# Patient Record
Sex: MALE | Race: WHITE | NOT HISPANIC OR LATINO | Employment: UNEMPLOYED | URBAN - NONMETROPOLITAN AREA
[De-identification: names, ages, dates, MRNs, and addresses within clinical notes are randomized per-mention and may not be internally consistent; named-entity substitution may affect disease eponyms.]

---

## 2017-01-24 ENCOUNTER — TELEPHONE (OUTPATIENT)
Dept: CARDIOLOGY | Facility: CLINIC | Age: 62
End: 2017-01-24

## 2017-02-02 NOTE — TELEPHONE ENCOUNTER
Ruth called me this morning and left a vm msg to call her back.  (I was helping with another clinic).  I called her back this afternoon and got her vm again, so I left a msg to let me know if pt is having any chest discomfort or not so we can proceed with getting the pt scheduled for the  or not.  I am still waiting for a response.  I told her she can leave this info on my vm if she gets it again so that I can get an answer instead of us playing phone tag.  Edgard Fraser, CMA

## 2017-02-03 NOTE — TELEPHONE ENCOUNTER
Rosalva from [facility] called me back and I was finally able to speak with her.  She said pt was evaluated on 2/1/17 and noted some left sided chest pain.  Need to know if Dr. Naranjo wants to proceed with the .?  Edgard Fraser, CMA

## 2017-02-06 DIAGNOSIS — I20.9 ANGINA, CLASS III (HCC): Primary | ICD-10-CM

## 2017-02-06 NOTE — TELEPHONE ENCOUNTER
I called and left a  msg for roberto at the [facility] and I put the order in for pt to have this done on 2/23/17.  I left on the  that we will be arranging the procedure on our end so she can arrange transportation for the pt on her end.  Edgard Fraser, CMA

## 2017-02-23 ENCOUNTER — HOSPITAL ENCOUNTER (OUTPATIENT)
Facility: HOSPITAL | Age: 62
Discharge: HOME | End: 2017-02-24
Attending: INTERNAL MEDICINE | Admitting: INTERNAL MEDICINE

## 2017-02-23 DIAGNOSIS — I20.9 ANGINA, CLASS III (HCC): ICD-10-CM

## 2017-02-23 LAB
ACT BLD: 240 SECONDS (ref 82–152)
ACT BLD: 312 SECONDS (ref 82–152)
ALBUMIN SERPL-MCNC: 4.6 G/DL (ref 3.5–5)
ALBUMIN/GLOB SERPL: 1.6 G/DL (ref 1.1–2.5)
ALP SERPL-CCNC: 78 U/L (ref 24–120)
ALT SERPL W P-5'-P-CCNC: 42 U/L (ref 0–54)
ANION GAP SERPL CALCULATED.3IONS-SCNC: 12 MMOL/L (ref 4–13)
AST SERPL-CCNC: 35 U/L (ref 7–45)
BACTERIA UR QL AUTO: ABNORMAL /HPF
BILIRUB SERPL-MCNC: 0.7 MG/DL (ref 0.1–1)
BILIRUB UR QL STRIP: NEGATIVE
BUN BLD-MCNC: 8 MG/DL (ref 5–21)
BUN/CREAT SERPL: 7.9 (ref 7–25)
CALCIUM SPEC-SCNC: 9.5 MG/DL (ref 8.4–10.4)
CHLORIDE SERPL-SCNC: 104 MMOL/L (ref 98–110)
CLARITY UR: CLEAR
CO2 SERPL-SCNC: 28 MMOL/L (ref 24–31)
COLOR UR: YELLOW
CREAT BLD-MCNC: 1.01 MG/DL (ref 0.5–1.4)
DEPRECATED RDW RBC AUTO: 45.6 FL (ref 40–54)
ERYTHROCYTE [DISTWIDTH] IN BLOOD BY AUTOMATED COUNT: 13.5 % (ref 12–15)
GFR SERPL CREATININE-BSD FRML MDRD: 75 ML/MIN/1.73
GLOBULIN UR ELPH-MCNC: 2.8 GM/DL
GLUCOSE BLD-MCNC: 102 MG/DL (ref 70–100)
GLUCOSE UR STRIP-MCNC: NEGATIVE MG/DL
HCT VFR BLD AUTO: 47 % (ref 40–52)
HGB BLD-MCNC: 15.9 G/DL (ref 14–18)
HGB UR QL STRIP.AUTO: ABNORMAL
HYALINE CASTS UR QL AUTO: ABNORMAL /LPF
KETONES UR QL STRIP: NEGATIVE
LEUKOCYTE ESTERASE UR QL STRIP.AUTO: NEGATIVE
MCH RBC QN AUTO: 31.5 PG (ref 28–32)
MCHC RBC AUTO-ENTMCNC: 33.8 G/DL (ref 33–36)
MCV RBC AUTO: 93.1 FL (ref 82–95)
NITRITE UR QL STRIP: NEGATIVE
PH UR STRIP.AUTO: 7 [PH] (ref 5–8)
PLATELET # BLD AUTO: 181 10*3/MM3 (ref 130–400)
PMV BLD AUTO: 11.2 FL (ref 6–12)
POTASSIUM BLD-SCNC: 4 MMOL/L (ref 3.5–5.3)
PROT SERPL-MCNC: 7.4 G/DL (ref 6.3–8.7)
PROT UR QL STRIP: NEGATIVE
RBC # BLD AUTO: 5.05 10*6/MM3 (ref 4.8–5.9)
RBC # UR: ABNORMAL /HPF
REF LAB TEST METHOD: ABNORMAL
SODIUM BLD-SCNC: 144 MMOL/L (ref 135–145)
SP GR UR STRIP: 1.01 (ref 1–1.03)
SQUAMOUS #/AREA URNS HPF: ABNORMAL /HPF
UROBILINOGEN UR QL STRIP: ABNORMAL
WBC NRBC COR # BLD: 7.29 10*3/MM3 (ref 4.8–10.8)
WBC UR QL AUTO: ABNORMAL /HPF

## 2017-02-23 PROCEDURE — 81001 URINALYSIS AUTO W/SCOPE: CPT | Performed by: INTERNAL MEDICINE

## 2017-02-23 PROCEDURE — C1769 GUIDE WIRE: HCPCS | Performed by: INTERNAL MEDICINE

## 2017-02-23 PROCEDURE — C1874 STENT, COATED/COV W/DEL SYS: HCPCS | Performed by: INTERNAL MEDICINE

## 2017-02-23 PROCEDURE — 85347 COAGULATION TIME ACTIVATED: CPT

## 2017-02-23 PROCEDURE — C1725 CATH, TRANSLUMIN NON-LASER: HCPCS | Performed by: INTERNAL MEDICINE

## 2017-02-23 PROCEDURE — 25010000002 DIPHENHYDRAMINE PER 50 MG: Performed by: INTERNAL MEDICINE

## 2017-02-23 PROCEDURE — 25010000002 MIDAZOLAM PER 1 MG: Performed by: INTERNAL MEDICINE

## 2017-02-23 PROCEDURE — 80053 COMPREHEN METABOLIC PANEL: CPT | Performed by: INTERNAL MEDICINE

## 2017-02-23 PROCEDURE — C1894 INTRO/SHEATH, NON-LASER: HCPCS | Performed by: INTERNAL MEDICINE

## 2017-02-23 PROCEDURE — C1887 CATHETER, GUIDING: HCPCS | Performed by: INTERNAL MEDICINE

## 2017-02-23 PROCEDURE — 93005 ELECTROCARDIOGRAM TRACING: CPT | Performed by: INTERNAL MEDICINE

## 2017-02-23 PROCEDURE — 99219 PR INITIAL OBSERVATION CARE/DAY 50 MINUTES: CPT | Performed by: NURSE PRACTITIONER

## 2017-02-23 PROCEDURE — G0378 HOSPITAL OBSERVATION PER HR: HCPCS

## 2017-02-23 PROCEDURE — 85027 COMPLETE CBC AUTOMATED: CPT | Performed by: INTERNAL MEDICINE

## 2017-02-23 PROCEDURE — 25010000002 FENTANYL CITRATE (PF) 100 MCG/2ML SOLUTION: Performed by: INTERNAL MEDICINE

## 2017-02-23 PROCEDURE — 25010000002 HEPARIN (PORCINE) PER 1000 UNITS: Performed by: INTERNAL MEDICINE

## 2017-02-23 PROCEDURE — 93010 ELECTROCARDIOGRAM REPORT: CPT | Performed by: INTERNAL MEDICINE

## 2017-02-23 PROCEDURE — C1760 CLOSURE DEV, VASC: HCPCS | Performed by: INTERNAL MEDICINE

## 2017-02-23 PROCEDURE — C9607 PERC D-E COR REVASC CHRO SIN: HCPCS | Performed by: INTERNAL MEDICINE

## 2017-02-23 DEVICE — EVEROLIMUS-ELUTING PLATINUM CHROMIUM CORONARY STENT SYSTEM
Type: IMPLANTABLE DEVICE | Status: FUNCTIONAL
Brand: SYNERGY™

## 2017-02-23 RX ORDER — INDOMETHACIN 50 MG/1
50 CAPSULE ORAL 2 TIMES DAILY WITH MEALS
Status: DISCONTINUED | OUTPATIENT
Start: 2017-02-23 | End: 2017-02-23

## 2017-02-23 RX ORDER — PANTOPRAZOLE SODIUM 40 MG/1
40 TABLET, DELAYED RELEASE ORAL DAILY
Status: DISCONTINUED | OUTPATIENT
Start: 2017-02-23 | End: 2017-02-24 | Stop reason: HOSPADM

## 2017-02-23 RX ORDER — SODIUM CHLORIDE 9 MG/ML
125 INJECTION, SOLUTION INTRAVENOUS CONTINUOUS
Status: DISPENSED | OUTPATIENT
Start: 2017-02-23 | End: 2017-02-23

## 2017-02-23 RX ORDER — ASPIRIN 325 MG
325 TABLET ORAL ONCE
Status: COMPLETED | OUTPATIENT
Start: 2017-02-23 | End: 2017-02-23

## 2017-02-23 RX ORDER — PRASUGREL 10 MG/1
10 TABLET, FILM COATED ORAL DAILY
Status: DISCONTINUED | OUTPATIENT
Start: 2017-02-23 | End: 2017-02-24 | Stop reason: HOSPADM

## 2017-02-23 RX ORDER — PRASUGREL 5 MG/1
TABLET, FILM COATED ORAL AS NEEDED
Status: DISCONTINUED | OUTPATIENT
Start: 2017-02-23 | End: 2017-02-23 | Stop reason: HOSPADM

## 2017-02-23 RX ORDER — IBUPROFEN 600 MG/1
600 TABLET ORAL 2 TIMES DAILY
Status: DISCONTINUED | OUTPATIENT
Start: 2017-02-23 | End: 2017-02-23

## 2017-02-23 RX ORDER — HEPARIN SODIUM 1000 [USP'U]/ML
INJECTION, SOLUTION INTRAVENOUS; SUBCUTANEOUS AS NEEDED
Status: DISCONTINUED | OUTPATIENT
Start: 2017-02-23 | End: 2017-02-23 | Stop reason: HOSPADM

## 2017-02-23 RX ORDER — IBUPROFEN 600 MG/1
600 TABLET ORAL EVERY 12 HOURS
Status: DISCONTINUED | OUTPATIENT
Start: 2017-02-23 | End: 2017-02-24 | Stop reason: HOSPADM

## 2017-02-23 RX ORDER — ATORVASTATIN CALCIUM 40 MG/1
40 TABLET, FILM COATED ORAL NIGHTLY
Status: DISCONTINUED | OUTPATIENT
Start: 2017-02-23 | End: 2017-02-24 | Stop reason: HOSPADM

## 2017-02-23 RX ORDER — INDOMETHACIN 50 MG/1
50 CAPSULE ORAL EVERY 12 HOURS
Status: DISCONTINUED | OUTPATIENT
Start: 2017-02-23 | End: 2017-02-24 | Stop reason: HOSPADM

## 2017-02-23 RX ORDER — LIDOCAINE HYDROCHLORIDE 20 MG/ML
INJECTION, SOLUTION INFILTRATION; PERINEURAL AS NEEDED
Status: DISCONTINUED | OUTPATIENT
Start: 2017-02-23 | End: 2017-02-23 | Stop reason: HOSPADM

## 2017-02-23 RX ORDER — MIDAZOLAM HYDROCHLORIDE 1 MG/ML
INJECTION INTRAMUSCULAR; INTRAVENOUS AS NEEDED
Status: DISCONTINUED | OUTPATIENT
Start: 2017-02-23 | End: 2017-02-23 | Stop reason: HOSPADM

## 2017-02-23 RX ORDER — SODIUM CHLORIDE 9 MG/ML
1-3 INJECTION, SOLUTION INTRAVENOUS CONTINUOUS
Status: DISCONTINUED | OUTPATIENT
Start: 2017-02-23 | End: 2017-02-24 | Stop reason: HOSPADM

## 2017-02-23 RX ORDER — SODIUM CHLORIDE 0.9 % (FLUSH) 0.9 %
1-10 SYRINGE (ML) INJECTION AS NEEDED
Status: DISCONTINUED | OUTPATIENT
Start: 2017-02-23 | End: 2017-02-23

## 2017-02-23 RX ORDER — DIPHENHYDRAMINE HYDROCHLORIDE 50 MG/ML
INJECTION INTRAMUSCULAR; INTRAVENOUS AS NEEDED
Status: DISCONTINUED | OUTPATIENT
Start: 2017-02-23 | End: 2017-02-23 | Stop reason: HOSPADM

## 2017-02-23 RX ORDER — INDOMETHACIN 50 MG/1
50 CAPSULE ORAL 2 TIMES DAILY WITH MEALS
COMMUNITY
End: 2017-04-17

## 2017-02-23 RX ORDER — DOCUSATE SODIUM 100 MG/1
100 CAPSULE, LIQUID FILLED ORAL DAILY
Status: DISCONTINUED | OUTPATIENT
Start: 2017-02-23 | End: 2017-02-24 | Stop reason: HOSPADM

## 2017-02-23 RX ORDER — FENTANYL CITRATE 50 UG/ML
INJECTION, SOLUTION INTRAMUSCULAR; INTRAVENOUS AS NEEDED
Status: DISCONTINUED | OUTPATIENT
Start: 2017-02-23 | End: 2017-02-23 | Stop reason: HOSPADM

## 2017-02-23 RX ORDER — ASPIRIN 81 MG/1
81 TABLET, CHEWABLE ORAL DAILY
Status: DISCONTINUED | OUTPATIENT
Start: 2017-02-23 | End: 2017-02-24 | Stop reason: HOSPADM

## 2017-02-23 RX ADMIN — INDOMETHACIN 50 MG: 50 CAPSULE ORAL at 13:25

## 2017-02-23 RX ADMIN — ASPIRIN 325 MG: 325 TABLET, COATED ORAL at 08:33

## 2017-02-23 RX ADMIN — DESMOPRESSIN ACETATE 40 MG: 0.2 TABLET ORAL at 22:09

## 2017-02-23 RX ADMIN — SODIUM CHLORIDE 125 ML/HR: 9 INJECTION, SOLUTION INTRAVENOUS at 14:56

## 2017-02-23 RX ADMIN — INDOMETHACIN 50 MG: 50 CAPSULE ORAL at 22:09

## 2017-02-23 RX ADMIN — IBUPROFEN 600 MG: 600 TABLET, FILM COATED ORAL at 22:10

## 2017-02-23 RX ADMIN — DOCUSATE SODIUM 100 MG: 100 CAPSULE ORAL at 15:43

## 2017-02-23 RX ADMIN — METOPROLOL TARTRATE 25 MG: 25 TABLET, FILM COATED ORAL at 22:09

## 2017-02-23 RX ADMIN — PANTOPRAZOLE SODIUM 40 MG: 40 TABLET, DELAYED RELEASE ORAL at 15:43

## 2017-02-23 RX ADMIN — SODIUM CHLORIDE 1 ML/KG/HR: 9 INJECTION, SOLUTION INTRAVENOUS at 08:33

## 2017-02-23 RX ADMIN — IBUPROFEN 600 MG: 600 TABLET, FILM COATED ORAL at 13:25

## 2017-02-23 NOTE — CONSULTS
Kingston Villagran  [id number]  [id number]  440/1  Freedom Naranjo MD  2/23/2017      HPI: Kingston Villagran is a 61 year old male with a history of a recent STEMI who had continued complaints os chest pain with exertion.  He had a known  of the RCA and underwent a heart cath today to fix.  Attempt was made to access via left femoral artery, but the wire could not be advance.  Dr. Naranjo did take an image of this, and noted total occlusion of the left common iliac.  Upon further questing, the patient states he has had numbness to his left foot.  He also states after about 50 feet, he would have to rest due to pain in his left leg, which he was told was sciatica.  He is a former smoker of many years.      Past Medical History   Diagnosis Date   • Arthritis    • Diabetes mellitus    • GERD (gastroesophageal reflux disease)    • Impaired vision in both eyes    • Irritable bowel syndrome (IBS)    • Rotator cuff injury      Right side       Past Surgical History   Procedure Laterality Date   • Tonsillectomy     • Pr rt/lt heart catheters N/A 12/18/2016     Procedure: Percutaneous Coronary Intervention;  Surgeon: Freedom Naranjo MD;  Location:  [facility];  Service: Cardiovascular   • Cardiac catheterization N/A 12/18/2016     Procedure: Left Heart Cath;  Surgeon: Freedom Naranjo MD;  Location:  [facility];  Service:    • Cardiac catheterization N/A 12/18/2016     Procedure: Left ventriculography;  Surgeon: Freedom Naranjo MD;  Location:  [facility];  Service:        Family History   Problem Relation Age of Onset   • Heart disease Mother        Social History     Social History   • Marital status: Single     Spouse name: N/A   • Number of children: N/A   • Years of education: N/A     Occupational History   • Not on file.     Social History Main Topics   • Smoking status: Former Smoker     Quit date: 2008   • Smokeless tobacco: Not on file   • Alcohol use No   • Drug use: No   • Sexual  activity: Defer     Other Topics Concern   • Not on file     Social History Narrative       No Known Allergies    Hospital Medications (active)       Dose Frequency Start End    aspirin chewable tablet 81 mg 81 mg Daily 2/23/2017     Sig - Route: Chew 1 tablet Daily. - Oral    aspirin tablet 325 mg 325 mg Once 2/23/2017 2/23/2017    Sig - Route: Take 1 tablet by mouth 1 (One) Time. - Oral    atorvastatin (LIPITOR) tablet 40 mg 40 mg Nightly 2/23/2017     Sig - Route: Take 1 tablet by mouth Every Night. - Oral    docusate sodium (COLACE) capsule 100 mg 100 mg Daily 2/23/2017     Sig - Route: Take 1 capsule by mouth Daily. - Oral    ibuprofen (ADVIL,MOTRIN) tablet 600 mg 600 mg 2 Times Daily 2/23/2017     Sig - Route: Take 1 tablet by mouth 2 (Two) Times a Day. - Oral    indomethacin (INDOCIN) capsule 50 mg 50 mg 2 Times Daily With Meals 2/23/2017     Sig - Route: Take 1 capsule by mouth 2 (Two) Times a Day With Meals. - Oral    metoprolol tartrate (LOPRESSOR) tablet 25 mg 25 mg Every 12 Hours Scheduled 2/23/2017     Sig - Route: Take 1 tablet by mouth Every 12 (Twelve) Hours. - Oral    pantoprazole (PROTONIX) EC tablet 40 mg 40 mg Daily 2/23/2017     Sig - Route: Take 1 tablet by mouth Daily. - Oral    prasugrel (EFFIENT) tablet 10 mg 10 mg Daily 2/23/2017     Sig - Route: Take 1 tablet by mouth Daily. - Oral    sodium chloride 0.9 % infusion 1-3 mL/kg/hr × 80.7 kg Continuous 2/23/2017     Sig - Route: Infuse 80.7-242.1 mL/hr into a venous catheter Continuous. - Intravenous    sodium chloride 0.9 % infusion 125 mL/hr Continuous 2/23/2017 2/23/2017    Sig - Route: Infuse 125 mL/hr into a venous catheter Continuous. - Intravenous    diphenhydrAMINE (BENADRYL) injection (Discontinued)  As Needed 2/23/2017 2/23/2017    Sig: As Needed.    Reason for Discontinue: Patient Discharge    FentaNYL Citrate (PF) (SUBLIMAZE) injection (Discontinued)  As Needed 2/23/2017 2/23/2017    Sig: As Needed.    Reason for Discontinue:  Patient Discharge    heparin (porcine) injection (Discontinued)  As Needed 2/23/2017 2/23/2017    Sig: As Needed.    Reason for Discontinue: Patient Discharge    lidocaine (XYLOCAINE) 2% injection (Discontinued)  As Needed 2/23/2017 2/23/2017    Sig: As Needed.    Reason for Discontinue: Patient Discharge    midazolam (VERSED) injection (Discontinued)  As Needed 2/23/2017 2/23/2017    Sig: As Needed.    Reason for Discontinue: Patient Discharge    prasugrel (EFFIENT) tablet (Discontinued)  As Needed 2/23/2017 2/23/2017    Sig: As Needed.    Reason for Discontinue: Patient Discharge    sodium chloride 0.9 % flush 1-10 mL (Discontinued) 1-10 mL As Needed 2/23/2017 2/23/2017    Sig - Route: Infuse 1-10 mL into a venous catheter As Needed for line care. - Intravenous    verapamil (ISOPTIN) 2,500 mcg, nitroglycerin (TRIDIL) 200 mcg, heparin (porcine) 8,000 Units radial artery injection (Discontinued)  As Needed 2/23/2017 2/23/2017    Sig: As Needed.    Reason for Discontinue: Patient Discharge          Review of Systems   Constitutional: Negative.    HENT: Negative.    Eyes: Negative.    Cardiovascular: Positive for chest pain (with exertion prior to heart cath today).        Left leg pain with ambulation   Gastrointestinal: Negative.    Endocrine: Negative.    Genitourinary: Negative.    Musculoskeletal: Negative.    Skin: Negative.    Allergic/Immunologic: Negative.    Neurological: Negative.    Hematological: Negative.    Psychiatric/Behavioral: Negative.    All other systems reviewed and are negative.      Physical Exam   Constitutional: He is oriented to person, place, and time. He appears well-developed and well-nourished. No distress.   HENT:   Head: Normocephalic and atraumatic.   Mouth/Throat: Oropharynx is clear and moist and mucous membranes are normal.   Eyes: Pupils are equal, round, and reactive to light.   Neck: Normal range of motion. No JVD present. Carotid bruit is not present.   Cardiovascular: Normal  rate, regular rhythm, S1 normal, S2 normal and normal heart sounds.  Exam reveals no gallop and no friction rub.    No murmur heard.  Pulses:       Femoral pulses are 2+ on the right side, and 0 on the left side.       Popliteal pulses are 2+ on the right side, and 0 on the left side.        Dorsalis pedis pulses are 2+ on the right side, and 0 on the left side.        Posterior tibial pulses are 2+ on the right side, and 0 on the left side.   Pulmonary/Chest: Effort normal and breath sounds normal.   Abdominal: Soft. Normal appearance, normal aorta and bowel sounds are normal. He exhibits no abdominal bruit. There is no hepatosplenomegaly. There is no tenderness.   Musculoskeletal: Normal range of motion.   Ankle and wrist chains in place       Vascular Status -  His exam exhibits no right foot edema. His exam exhibits no left foot edema.  Neurological: He is alert and oriented to person, place, and time. He has normal strength. No cranial nerve deficit.   Skin: Skin is warm, dry and intact. He is not diaphoretic.   Psychiatric: He has a normal mood and affect. His behavior is normal. Judgment and thought content normal. Cognition and memory are normal.       Laboratory Data:    Results from last 7 days  Lab Units 02/23/17  0823   WBC 10*3/mm3 7.29   HEMOGLOBIN g/dL 15.9   HEMATOCRIT % 47.0   PLATELETS 10*3/mm3 181         Results from last 7 days  Lab Units 02/23/17  0823   SODIUM mmol/L 144   POTASSIUM mmol/L 4.0   CHLORIDE mmol/L 104   TOTAL CO2 mmol/L 28.0   BUN mg/dL 8   CREATININE mg/dL 1.01   CALCIUM mg/dL 9.5   BILIRUBIN mg/dL 0.7   ALK PHOS U/L 78   ALT (SGPT) U/L 42   AST (SGOT) U/L 35   GLUCOSE mg/dL 102*             Diagnostic Data:  Imaging Results (last 24 hours)     ** No results found for the last 24 hours. **          Impression:  1. PAD with claudication  2. 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  Active Problems:    Hyperlipidemia with target LDL less than 70    Chronic total  occlusion of native coronary artery    Angina, class III      Plan: After thoroughly evaluating Kingston Villagran, I believe the best course of action is to remain conservative right now.  He will need an angiogram of the left lower extremity in the future.  We will schedule him to follow up in the office in 3 weeks to arrange this.  He is scheduled to see us on March 16 @ 3:15.  This was all discussed in full with complete understanding.    Thank you for allowing me to participate in the care of your patient.  Please do not hesitate to call with any questions or concerns.     CLAY Naqvi      Attestation: The patient was seen/examined at the bedside.  Agree with above evaluation.  Patient will follow up as an outpatient for evaluation and scheduling for formal revascularization.  This was all discussed in full with complete understanding.

## 2017-02-23 NOTE — H&P
No chief complaint on file.      Subjective .     History of present illness:  Kingston Villagran is a 61 y.o. yo male with history of recent STEMI who complains of recurrent chest pain with exertion. He has a known  of the RCA and is here to have the RCA fixed..    History  Past Medical History   Diagnosis Date   • Arthritis    • Diabetes mellitus    • GERD (gastroesophageal reflux disease)    • Impaired vision in both eyes    • Irritable bowel syndrome (IBS)    • Rotator cuff injury      Right side   ,   Past Surgical History   Procedure Laterality Date   • Tonsillectomy     • Pr rt/lt heart catheters N/A 12/18/2016     Procedure: Percutaneous Coronary Intervention;  Surgeon: Freedom Naranjo MD;  Location:  [facility];  Service: Cardiovascular   • Cardiac catheterization N/A 12/18/2016     Procedure: Left Heart Cath;  Surgeon: Freedom Naranjo MD;  Location:  [facility];  Service:    • Cardiac catheterization N/A 12/18/2016     Procedure: Left ventriculography;  Surgeon: Freedom Naranjo MD;  Location:  [facility];  Service:    ,   Family History   Problem Relation Age of Onset   • Heart disease Mother    ,   Social History   Substance Use Topics   • Smoking status: Former Smoker     Quit date: 2008   • Smokeless tobacco: None   • Alcohol use No   ,     Medications  Current Facility-Administered Medications   Medication Dose Route Frequency Provider Last Rate Last Dose   • diphenhydrAMINE (BENADRYL) injection    PRN Freedom Naranjo MD   50 mg at 02/23/17 0951   • FentaNYL Citrate (PF) (SUBLIMAZE) injection    PRN Freedom Naranjo MD   25 mcg at 02/23/17 1003   • midazolam (VERSED) injection    PRN Freedom Naranjo MD   1 mg at 02/23/17 1002   • sodium chloride 0.9 % flush 1-10 mL  1-10 mL Intravenous PRN Freedom Naranjo MD       • sodium chloride 0.9 % infusion  1-3 mL/kg/hr Intravenous Continuous Freedom Naranjo MD 80.7 mL/hr at 02/23/17 0833 1 mL/kg/hr at 02/23/17 0833  "      Allergies:  Review of patient's allergies indicates no known allergies.    Review of Systems  Review of Systems   HENT: Negative for nosebleeds.    Cardiovascular: Negative for chest pain, claudication, dyspnea on exertion, irregular heartbeat, leg swelling, near-syncope, orthopnea, palpitations, paroxysmal nocturnal dyspnea and syncope.   Respiratory: Negative for cough, hemoptysis and shortness of breath.    Gastrointestinal: Negative for dysphagia, hematemesis and melena.   Genitourinary: Negative for hematuria.       Objective     Physical Exam:  Patient Vitals for the past 24 hrs:   BP Temp Temp src Pulse Resp SpO2 Height Weight   02/23/17 0815 129/96 97.4 °F (36.3 °C) Temporal Art 70 16 96 % 69\" (175.3 cm) 178 lb (80.7 kg)     Physical Exam   Constitutional: He is oriented to person, place, and time. He appears well-developed and well-nourished. No distress.   HENT:   Head: Normocephalic.   Eyes: No scleral icterus.   Neck: Normal range of motion. Neck supple.   Cardiovascular: Normal rate, regular rhythm and normal heart sounds.  Exam reveals no gallop and no friction rub.    No murmur heard.  Pulmonary/Chest: Effort normal and breath sounds normal. No respiratory distress. He has no wheezes. He has no rales.   Abdominal: Soft. Bowel sounds are normal. He exhibits no distension. There is no tenderness.   Musculoskeletal: He exhibits no edema.   Ankle and wrist chains are in place   Neurological: He is alert and oriented to person, place, and time.   Skin: Skin is warm and dry. He is not diaphoretic. No erythema.   Psychiatric: He has a normal mood and affect. His behavior is normal.       Results Review:   I reviewed the patient's new clinical results.  Lab Results (last 24 hours)     Procedure Component Value Units Date/Time    CBC (No Diff) [[id number]]  (Normal) Collected:  02/23/17 0823    Specimen:  Blood Updated:  02/23/17 0834     WBC 7.29 10*3/mm3      RBC 5.05 10*6/mm3      Hemoglobin 15.9 g/dL  "     Hematocrit 47.0 %      MCV 93.1 fL      MCH 31.5 pg      MCHC 33.8 g/dL      RDW 13.5 %      RDW-SD 45.6 fl      MPV 11.2 fL      Platelets 181 10*3/mm3     Comprehensive Metabolic Panel [[id number]]  (Abnormal) Collected:  02/23/17 0823    Specimen:  Blood Updated:  02/23/17 0852     Glucose 102 (H) mg/dL      BUN 8 mg/dL      Creatinine 1.01 mg/dL      Sodium 144 mmol/L      Potassium 4.0 mmol/L      Chloride 104 mmol/L      CO2 28.0 mmol/L      Calcium 9.5 mg/dL      Total Protein 7.4 g/dL      Albumin 4.60 g/dL      ALT (SGPT) 42 U/L      AST (SGOT) 35 U/L      Alkaline Phosphatase 78 U/L      Total Bilirubin 0.7 mg/dL      eGFR Non African Amer 75 mL/min/1.73      Globulin 2.8 gm/dL      A/G Ratio 1.6 g/dL      BUN/Creatinine Ratio 7.9      Anion Gap 12.0 mmol/L         Imaging Results (last 24 hours)     ** No results found for the last 24 hours. **        No results found for: ECHOEFEST      Assessment/Plan   Active Problems:    Hyperlipidemia with target LDL less than 70    Chronic total occlusion of native coronary artery    Plan  procedure of the RCA

## 2017-02-23 NOTE — PLAN OF CARE
Problem: Patient Care Overview (Adult)  Goal: Plan of Care Review  Outcome: Ongoing (interventions implemented as appropriate)    02/23/17 1492   Coping/Psychosocial Response Interventions   Plan Of Care Reviewed With patient;other (see comments)  (guards at bedside)   Patient Care Overview   Progress no change   Outcome Evaluation   Outcome Summary/Follow up Plan Pt came to floor from cath lab today after successful opening of  of RCA. Stent placed. Right groin and right radial sites are clean, dry and intact. TR band and safeguards are in place. Pt may sit up at 1800 and may walk at 2000. Continue to monitor insertion sites. Monitor VS and labs.        Goal: Adult Individualization and Mutuality  Outcome: Ongoing (interventions implemented as appropriate)  Goal: Discharge Needs Assessment  Outcome: Ongoing (interventions implemented as appropriate)    Problem: Cardiac Catheterization with/without PCI (Adult)  Goal: Signs and Symptoms of Listed Potential Problems Will be Absent or Manageable (Cardiac Catheterization with/without PCI)  Outcome: Ongoing (interventions implemented as appropriate)

## 2017-02-24 VITALS
OXYGEN SATURATION: 95 % | HEIGHT: 69 IN | TEMPERATURE: 98.7 F | RESPIRATION RATE: 18 BRPM | WEIGHT: 190 LBS | BODY MASS INDEX: 28.14 KG/M2 | DIASTOLIC BLOOD PRESSURE: 65 MMHG | SYSTOLIC BLOOD PRESSURE: 130 MMHG | HEART RATE: 61 BPM

## 2017-02-24 LAB
ANION GAP SERPL CALCULATED.3IONS-SCNC: 11 MMOL/L (ref 4–13)
BUN BLD-MCNC: 12 MG/DL (ref 5–21)
BUN/CREAT SERPL: 11.7 (ref 7–25)
CALCIUM SPEC-SCNC: 9.3 MG/DL (ref 8.4–10.4)
CHLORIDE SERPL-SCNC: 104 MMOL/L (ref 98–110)
CO2 SERPL-SCNC: 28 MMOL/L (ref 24–31)
CREAT BLD-MCNC: 1.03 MG/DL (ref 0.5–1.4)
DEPRECATED RDW RBC AUTO: 44.4 FL (ref 40–54)
ERYTHROCYTE [DISTWIDTH] IN BLOOD BY AUTOMATED COUNT: 13.5 % (ref 12–15)
GFR SERPL CREATININE-BSD FRML MDRD: 73 ML/MIN/1.73
GLUCOSE BLD-MCNC: 102 MG/DL (ref 70–100)
HCT VFR BLD AUTO: 42.7 % (ref 40–52)
HGB BLD-MCNC: 14.3 G/DL (ref 14–18)
MCH RBC QN AUTO: 30.6 PG (ref 28–32)
MCHC RBC AUTO-ENTMCNC: 33.5 G/DL (ref 33–36)
MCV RBC AUTO: 91.4 FL (ref 82–95)
PLATELET # BLD AUTO: 184 10*3/MM3 (ref 130–400)
PMV BLD AUTO: 10.4 FL (ref 6–12)
POTASSIUM BLD-SCNC: 3.8 MMOL/L (ref 3.5–5.3)
RBC # BLD AUTO: 4.67 10*6/MM3 (ref 4.8–5.9)
SODIUM BLD-SCNC: 143 MMOL/L (ref 135–145)
WBC NRBC COR # BLD: 9.89 10*3/MM3 (ref 4.8–10.8)

## 2017-02-24 PROCEDURE — 93005 ELECTROCARDIOGRAM TRACING: CPT | Performed by: NURSE PRACTITIONER

## 2017-02-24 PROCEDURE — 90732 PPSV23 VACC 2 YRS+ SUBQ/IM: CPT | Performed by: INTERNAL MEDICINE

## 2017-02-24 PROCEDURE — 25010000004 INFLUENZA VAC SUBUNIT QUAD 0.5 ML SUSPENSION PREFILLED SYRINGE: Performed by: INTERNAL MEDICINE

## 2017-02-24 PROCEDURE — 93010 ELECTROCARDIOGRAM REPORT: CPT | Performed by: INTERNAL MEDICINE

## 2017-02-24 PROCEDURE — 85027 COMPLETE CBC AUTOMATED: CPT | Performed by: INTERNAL MEDICINE

## 2017-02-24 PROCEDURE — G0009 ADMIN PNEUMOCOCCAL VACCINE: HCPCS | Performed by: INTERNAL MEDICINE

## 2017-02-24 PROCEDURE — 25010000004 PNEUMOCOCCAL VAC POLYVALENT PER 0.5 ML: Performed by: INTERNAL MEDICINE

## 2017-02-24 PROCEDURE — 80048 BASIC METABOLIC PNL TOTAL CA: CPT | Performed by: INTERNAL MEDICINE

## 2017-02-24 PROCEDURE — 90661 CCIIV3 VAC ABX FR 0.5 ML IM: CPT | Performed by: INTERNAL MEDICINE

## 2017-02-24 PROCEDURE — 99217 PR OBSERVATION CARE DISCHARGE MANAGEMENT: CPT | Performed by: INTERNAL MEDICINE

## 2017-02-24 PROCEDURE — G0008 ADMIN INFLUENZA VIRUS VAC: HCPCS | Performed by: INTERNAL MEDICINE

## 2017-02-24 PROCEDURE — 93005 ELECTROCARDIOGRAM TRACING: CPT | Performed by: INTERNAL MEDICINE

## 2017-02-24 RX ADMIN — INDOMETHACIN 50 MG: 50 CAPSULE ORAL at 09:05

## 2017-02-24 RX ADMIN — Medication 81 MG: at 08:56

## 2017-02-24 RX ADMIN — INFLUENZA A VIRUS A/BRISBANE/10/2010 (H1N1) ANTIGEN (MDCK CELL DERIVED, PROPIOLACTONE INACTIVATED), INFLUENZA A VIRUS A/HONG KONG/4801/2014 (H3N2) ANTIGEN (MDCK CELL DERIVED, PROPIOLACTONE INACTIVATED), INFLUENZA B VIRUS B/UTAH/9/2014 ANTIGEN (MDCK CELL DERIVED, PROPIOLACTONE INACTIVATED) AND INFLUENZA B VIRUS B/HONG KONG/259/2010 ANTIGEN (MDCK CELL DERIVED, PROPIOLACTONE INACTIVATED) 0.5 ML: 15; 15; 15; 15 INJECTION, SUSPENSION INTRAMUSCULAR at 08:56

## 2017-02-24 RX ADMIN — PNEUMOCOCCAL VACCINE POLYVALENT 0.5 ML
25; 25; 25; 25; 25; 25; 25; 25; 25; 25; 25; 25; 25; 25; 25; 25; 25; 25; 25; 25; 25; 25; 25 INJECTION, SOLUTION INTRAMUSCULAR; SUBCUTANEOUS at 08:57

## 2017-02-24 RX ADMIN — DOCUSATE SODIUM 100 MG: 100 CAPSULE ORAL at 08:56

## 2017-02-24 RX ADMIN — IBUPROFEN 600 MG: 600 TABLET, FILM COATED ORAL at 09:05

## 2017-02-24 RX ADMIN — PANTOPRAZOLE SODIUM 40 MG: 40 TABLET, DELAYED RELEASE ORAL at 08:56

## 2017-02-24 RX ADMIN — PRASUGREL HYDROCHLORIDE 10 MG: 10 TABLET, FILM COATED ORAL at 08:56

## 2017-02-24 RX ADMIN — METOPROLOL TARTRATE 25 MG: 25 TABLET, FILM COATED ORAL at 08:56

## 2017-02-24 NOTE — PLAN OF CARE
Problem: Patient Care Overview (Adult)  Goal: Plan of Care Review  Outcome: Ongoing (interventions implemented as appropriate)    02/24/17 0412   Coping/Psychosocial Response Interventions   Plan Of Care Reviewed With patient;other (see comments)  (guards)   Patient Care Overview   Progress progress toward functional goals as expected   Outcome Evaluation   Outcome Summary/Follow up Plan Pt received post-cath fluids. Sites good. Right radial site bruised, semi-firm. Advised pt to avoid excessive use of wrist. Bilateral groins soft, non-tender, non-bruised. Pt ready for discharge.         Problem: Cardiac Catheterization with/without PCI (Adult)  Goal: Signs and Symptoms of Listed Potential Problems Will be Absent or Manageable (Cardiac Catheterization with/without PCI)  Outcome: Ongoing (interventions implemented as appropriate)

## 2017-02-24 NOTE — DISCHARGE SUMMARY
[facility] HEART GROUP DISCHARGE    Date of Discharge:  2/24/2017    Discharge Diagnosis: Active Problems:    Hyperlipidemia with target LDL less than 70    Chronic total occlusion of native coronary artery    Angina, class III      Presenting Problem/History of Present Illness  Angina, class III [I20.9]  Angina, class III [I20.9]  Angina, class III [I20.9]      Hospital Course  Patient is a 61 y.o. male presented with chest pain with known  of RCA, patient underwent heart cath yesterday for which he had successful  Procedure. During the procedure however Dr. Naranjo revealed a total occlusion of left common iliac, for this Vascular was consulted. Patient's follow up labs look good. Patient had one brief episode of chest pain, an EKG was checked with no changes. Patient's vital signs have been stable.     Procedures Performed  Procedure(s):  Chronic Total Occlussion       Consults:   Consults     Date and Time Order Name Status Description    2/23/2017 1216 Inpatient Consult to Vascular Surgery            Labs:  Lab Results (last 24 hours)     Procedure Component Value Units Date/Time    Urinalysis With / Culture If Indicated [[id number]]  (Abnormal) Collected:  02/23/17 1055    Specimen:  Urine from Urine, Catheter Updated:  02/23/17 1258     Color, UA Yellow      Appearance, UA Clear      pH, UA 7.0      Specific Gravity, UA 1.015      Glucose, UA Negative      Ketones, UA Negative      Bilirubin, UA Negative      Blood, UA Trace (A)      Protein, UA Negative      Leuk Esterase, UA Negative      Nitrite, UA Negative      Urobilinogen, UA 0.2 E.U./dL     Urinalysis, Microscopic Only [[id number]]  (Abnormal) Collected:  02/23/17 1055    Specimen:  Urine from Urine, Catheter Updated:  02/23/17 1321     RBC, UA 0-2 (A) /HPF      WBC, UA None Seen /HPF      Bacteria, UA None Seen /HPF      Squamous Epithelial Cells, UA 0-2 /HPF      Hyaline Casts, UA None Seen /LPF      Methodology Automated Microscopy      CBC (No Diff) [[id number]]  (Abnormal) Collected:  02/24/17 0849    Specimen:  Blood Updated:  02/24/17 0856     WBC 9.89 10*3/mm3      RBC 4.67 (L) 10*6/mm3      Hemoglobin 14.3 g/dL      Hematocrit 42.7 %      MCV 91.4 fL      MCH 30.6 pg      MCHC 33.5 g/dL      RDW 13.5 %      RDW-SD 44.4 fl      MPV 10.4 fL      Platelets 184 10*3/mm3     Basic Metabolic Panel [[id number]]  (Abnormal) Collected:  02/24/17 0849    Specimen:  Blood Updated:  02/24/17 0918     Glucose 102 (H) mg/dL      BUN 12 mg/dL      Creatinine 1.03 mg/dL      Sodium 143 mmol/L      Potassium 3.8 mmol/L      Chloride 104 mmol/L      CO2 28.0 mmol/L      Calcium 9.3 mg/dL      eGFR Non African Amer 73 mL/min/1.73      BUN/Creatinine Ratio 11.7      Anion Gap 11.0 mmol/L     Narrative:       GFR Normal >60  Chronic Kidney Disease <60  Kidney Failure <15        Condition on Discharge: Stable    Physical Exam at Discharge    Vital Signs  Temp:  [97.7 °F (36.5 °C)-98.9 °F (37.2 °C)] 98.7 °F (37.1 °C)  Heart Rate:  [58-93] 61  Resp:  [15-20] 18  BP: (119-146)/(65-96) 130/65    Physical Exam:  Physical Exam   Constitutional: He is oriented to person, place, and time. He appears well-developed and well-nourished.   HENT:   Head: Normocephalic and atraumatic.   Eyes: Pupils are equal, round, and reactive to light.   Neck: Normal range of motion. Neck supple. No JVD present. Carotid bruit is not present.   Cardiovascular: Normal rate, regular rhythm, normal heart sounds and intact distal pulses.    Groin check okay   Pulmonary/Chest: Effort normal and breath sounds normal.   Abdominal: Soft. Bowel sounds are normal.   Musculoskeletal: Normal range of motion.   Neurological: He is alert and oriented to person, place, and time. He has normal reflexes.   Skin: Skin is warm and dry.   Psychiatric: He has a normal mood and affect. His behavior is normal. Judgment and thought content normal.     Discharge Medications   Villagran, Kingston   Home Medication Instructions  Page Hospital:[phone]    Printed on:02/24/17 9559   Medication Information                      aspirin 81 MG chewable tablet  Chew 1 tablet Daily.             atorvastatin (LIPITOR) 40 MG tablet  Take 1 tablet by mouth Every Night.             docusate sodium (COLACE) 100 MG capsule  Take 100 mg by mouth Daily.             ibuprofen (ADVIL,MOTRIN) 600 MG tablet  Take 600 mg by mouth 2 (Two) Times a Day.             indomethacin (INDOCIN) 50 MG capsule  Take 50 mg by mouth 2 (Two) Times a Day With Meals.             metoprolol tartrate (LOPRESSOR) 25 MG tablet  Take 1 tablet by mouth Every 12 (Twelve) Hours.             pantoprazole (PROTONIX) 40 MG EC tablet  Take 40 mg by mouth Daily.             prasugrel (EFFIENT) 10 MG tablet  Take 1 tablet by mouth Daily.                 Discharge Diet: Cardiac    Activity at Discharge: Limited for 3-5 days.    Follow-up Appointments  Future Appointments  Date Time Provider Department Center   3/16/2017 3:15 PM Sean Roque, DO [facility] None   Follow up with Dr. Naranjo in 3 months    Plan: will discharge patient back to long-term today. Patient has appointment scheduled with Vascular 3/16 for angiogram of left lower extremity. Continue Current Medications. Follow up with Dr. Naranjo in 3 months. Routine Groin Care.          CLAY Mullins  02/24/17  12:26 PM

## 2017-03-09 ENCOUNTER — TELEPHONE (OUTPATIENT)
Dept: CARDIOLOGY | Facility: CLINIC | Age: 62
End: 2017-03-09

## 2017-03-09 NOTE — TELEPHONE ENCOUNTER
Was wanting to know if pt can come off the Effient to have a tooth pulled.  Pt was recently stented in feb.  Also wanted to know why Dr. Roque was called in as a consult.  I called back and left a msg on a vm [phone] that the pt can not come off effient due to the risk of stent thrombosis/death ect.  He could have the tooth taken care of if left on effient but again stressed can not come off it.  I answered the question about Dr. Roque seeing the pt for PAD and lower extremity problems and told them we can send the records if needed.  I gave her my call back number if she needed to call me back.  Edgard Fraser, CMA

## 2017-03-16 ENCOUNTER — OFFICE VISIT (OUTPATIENT)
Dept: VASCULAR SURGERY | Facility: CLINIC | Age: 62
End: 2017-03-16

## 2017-03-16 VITALS
HEART RATE: 63 BPM | BODY MASS INDEX: 28.73 KG/M2 | SYSTOLIC BLOOD PRESSURE: 132 MMHG | WEIGHT: 194 LBS | HEIGHT: 69 IN | DIASTOLIC BLOOD PRESSURE: 88 MMHG

## 2017-03-16 DIAGNOSIS — I73.9 PAD (PERIPHERAL ARTERY DISEASE) (HCC): Primary | ICD-10-CM

## 2017-03-16 DIAGNOSIS — I25.10 CHRONIC TOTAL OCCLUSION OF NATIVE CORONARY ARTERY: ICD-10-CM

## 2017-03-16 DIAGNOSIS — E78.5 HYPERLIPIDEMIA WITH TARGET LDL LESS THAN 70: ICD-10-CM

## 2017-03-16 DIAGNOSIS — I25.82 CHRONIC TOTAL OCCLUSION OF NATIVE CORONARY ARTERY: ICD-10-CM

## 2017-03-16 DIAGNOSIS — R07.9 CHEST PAIN, UNSPECIFIED TYPE: Primary | ICD-10-CM

## 2017-03-16 PROCEDURE — 99214 OFFICE O/P EST MOD 30 MIN: CPT | Performed by: NURSE PRACTITIONER

## 2017-03-16 RX ORDER — ALBUTEROL SULFATE 90 UG/1
2 AEROSOL, METERED RESPIRATORY (INHALATION) EVERY 4 HOURS PRN
COMMUNITY

## 2017-03-16 NOTE — PROGRESS NOTES
03/16/2017      Freedom Naranjo MD  [address] [facility] [address]  [address]  [place] [address]    Kingston Villagran  1955    Chief Complaint   Patient presents with   • Follow-up     3 wk hospital f/u/schedule angiogram of LL extremity       Dear Freedom Naranjo MD:      HPI  I had the pleasure of seeing your patient Kingston Villagran in the office today.  Thank you kindly for this consultation.  As you recall, Kingston Villagran is a 61 y.o.  male who you are currently following for coronary artery disease.  He was compplaining of chest pain with a known  of RCA and underwent a heart cath in which he had a successful  procedure.  During the procedure however Dr. Naranjo revealed a total occlusion of left common iliac   His left leg has been bothering him for 2-3 years, but he was told he had sciatica.  He does complain of some intermittent chest pain throughout the day, even worsening since his heart cath.   He is currently maintained on Effient and Aspirin.    Past Medical History   Diagnosis Date   • Arthritis    • Diabetes mellitus    • GERD (gastroesophageal reflux disease)    • Impaired vision in both eyes    • Irritable bowel syndrome (IBS)    • Rotator cuff injury      Right side       Past Surgical History   Procedure Laterality Date   • Tonsillectomy     • Pr rt/lt heart catheters N/A 12/18/2016     Procedure: Percutaneous Coronary Intervention;  Surgeon: Freedom Naranjo MD;  Location:  [facility] C[facility]AS[facility];  Service: Cardiovascular   • Cardiac catheterization N/A 12/18/2016     Procedure: Left Heart Cath;  Surgeon: Freedom Naranjo MD;  Location:  [facility];  Service:    • Cardiac catheterization N/A 12/18/2016     Procedure: Left ventriculography;  Surgeon: Freedom Naranjo MD;  Location:  [facility];  Service:    • Cardiac catheterization N/A 2/23/2017     Procedure: Chronic Total Occlussion;  Surgeon: Freedom Naranjo MD;  Location:  [facility]ATH INV[facility];  Service:        Family  History   Problem Relation Age of Onset   • Heart disease Mother        Social History     Social History   • Marital status: Single     Spouse name: N/A   • Number of children: N/A   • Years of education: N/A     Occupational History   • Not on file.     Social History Main Topics   • Smoking status: Former Smoker     Quit date: 2008   • Smokeless tobacco: Former User   • Alcohol use No   • Drug use: No   • Sexual activity: Defer     Other Topics Concern   • Not on file     Social History Narrative       No Known Allergies    Prior to Admission medications    Medication Sig Start Date End Date Taking? Authorizing Provider   albuterol (PROVENTIL HFA;VENTOLIN HFA) 108 (90 BASE) MCG/ACT inhaler Inhale 2 puffs Every 4 (Four) Hours As Needed for Wheezing.   Yes Historical Provider, MD   aspirin 81 MG chewable tablet Chew 1 tablet Daily. 12/19/16  Yes Freedom Naranjo MD   atorvastatin (LIPITOR) 40 MG tablet Take 1 tablet by mouth Every Night. 12/21/16  Yes Freedom Naranjo MD   docusate sodium (COLACE) 100 MG capsule Take 100 mg by mouth Daily.   Yes Historical Provider, MD   ibuprofen (ADVIL,MOTRIN) 600 MG tablet Take 600 mg by mouth 2 (Two) Times a Day.   Yes Historical Provider, MD   indomethacin (INDOCIN) 50 MG capsule Take 50 mg by mouth 2 (Two) Times a Day With Meals.   Yes Historical Provider, MD   metoprolol tartrate (LOPRESSOR) 25 MG tablet Take 1 tablet by mouth Every 12 (Twelve) Hours. 12/21/16  Yes Freedom Naranjo MD   pantoprazole (PROTONIX) 40 MG EC tablet Take 40 mg by mouth Daily.   Yes Historical Provider, MD   prasugrel (EFFIENT) 10 MG tablet Take 1 tablet by mouth Daily. 12/21/16  Yes Freedom Naranjo MD       Review of Systems   Constitutional: Negative.    HENT: Negative.    Eyes: Negative.    Respiratory: Negative.  Negative for shortness of breath.    Cardiovascular: Positive for chest pain (intermittently).        Left leg pain   Gastrointestinal: Negative.    Endocrine: Negative.    Genitourinary:  "Negative.    Musculoskeletal: Negative.    Skin: Negative.    Allergic/Immunologic: Negative.    Neurological: Negative.    Hematological: Negative.    Psychiatric/Behavioral: Negative.    All other systems reviewed and are negative.      Visit Vitals   • /88   • Pulse 63   • Ht 69\" (175.3 cm)   • Wt 194 lb (88 kg)   • BMI 28.65 kg/m2     Physical Exam  Constitutional: He is oriented to person, place, and time. He appears well-developed and well-nourished. No distress.   HENT:   Head: Normocephalic and atraumatic.   Mouth/Throat: Oropharynx is clear and moist and mucous membranes are normal.   Eyes: Pupils are equal, round, and reactive to light.   Neck: Normal range of motion. No JVD present. Carotid bruit is not present.   Cardiovascular: Normal rate, regular rhythm, S1 normal, S2 normal and normal heart sounds. Exam reveals no gallop and no friction rub.   No murmur heard.  Pulses:  Femoral pulses are 2+ on the right side, and 0 on the left side.  Popliteal pulses are 2+ on the right side, and 0 on the left side.   Dorsalis pedis pulses are 2+ on the right side, and 0 on the left side.   Posterior tibial pulses are 2+ on the right side, and 0 on the left side.   Pulmonary/Chest: Effort normal and breath sounds normal.   Abdominal: Soft. Normal appearance, normal aorta and bowel sounds are normal. He exhibits no abdominal bruit. There is no hepatosplenomegaly. There is no tenderness.   Musculoskeletal: Normal range of motion.   Ankle and wrist chains in place     Vascular Status - His exam exhibits no right foot edema. His exam exhibits no left foot edema.  Neurological: He is alert and oriented to person, place, and time. He has normal strength. No cranial nerve deficit.   Skin: Skin is warm, dry and intact. He is not diaphoretic.   Psychiatric: He has a normal mood and affect. His behavior is normal. Judgment and thought content normal. Cognition and memory are normal.       Patient Active Problem List "   Diagnosis   • Hyperlipidemia with target LDL less than 70   • Chronic total occlusion of native coronary artery   • Angina, class III         ICD-10-CM ICD-9-CM   1. PAD (peripheral artery disease) I73.9 443.9   2. Chronic total occlusion of native coronary artery I25.10 414.01    I25.82 414.2   3. Hyperlipidemia with target LDL less than 70 E78.5 272.4       Plan: After thoroughly evaluating Kingston Villagran, I believe the best course of action is to proceed with an angiogram of the left lower extremity.  However, he is complaining of chest pain intermittently.  Dr. Roque did discuss with Dr. Naranjo, and instructed to call his office to schedule a nuclear stress test.  I did speak with CLAY Medellin regarding.  Risks of angiogram were discussed.  These include, but are not limited to, bleeding, infection, vessel damage, nerve damage, embolus, and loss of limb.  The patient understands these risks and wishes to proceed with procedure.  After this is performed, we will schedule his angiogram.    Thank you for allowing me to participate in the care of your patient.  Please do not hesitate with any questions or concerns.  I will keep you aware of any further encounters with Kingston Villagran.        Sincerely yours,         CLAY Naqvi APRN

## 2017-03-31 ENCOUNTER — HOSPITAL ENCOUNTER (OUTPATIENT)
Dept: CARDIOLOGY | Facility: HOSPITAL | Age: 62
Discharge: HOME | End: 2017-03-31
Admitting: NURSE PRACTITIONER

## 2017-03-31 ENCOUNTER — HOSPITAL ENCOUNTER (OUTPATIENT)
Dept: CARDIOLOGY | Facility: HOSPITAL | Age: 62
Discharge: HOME | End: 2017-03-31

## 2017-03-31 VITALS — SYSTOLIC BLOOD PRESSURE: 141 MMHG | HEART RATE: 65 BPM | DIASTOLIC BLOOD PRESSURE: 75 MMHG

## 2017-03-31 DIAGNOSIS — R07.9 CHEST PAIN, UNSPECIFIED TYPE: ICD-10-CM

## 2017-03-31 PROCEDURE — 78452 HT MUSCLE IMAGE SPECT MULT: CPT

## 2017-03-31 PROCEDURE — 93018 CV STRESS TEST I&R ONLY: CPT | Performed by: INTERNAL MEDICINE

## 2017-03-31 PROCEDURE — 0 TECHNETIUM SESTAMIBI: Performed by: NURSE PRACTITIONER

## 2017-03-31 PROCEDURE — 25010000002 REGADENOSON 0.4 MG/5ML SOLUTION: Performed by: INTERNAL MEDICINE

## 2017-03-31 PROCEDURE — 78452 HT MUSCLE IMAGE SPECT MULT: CPT | Performed by: INTERNAL MEDICINE

## 2017-03-31 PROCEDURE — A9500 TC99M SESTAMIBI: HCPCS | Performed by: NURSE PRACTITIONER

## 2017-03-31 PROCEDURE — 93017 CV STRESS TEST TRACING ONLY: CPT

## 2017-03-31 RX ADMIN — REGADENOSON 0.4 MG: 0.08 INJECTION, SOLUTION INTRAVENOUS at 08:51

## 2017-03-31 RX ADMIN — Medication 1 DOSE: at 07:33

## 2017-03-31 RX ADMIN — Medication 1 DOSE: at 08:52

## 2017-04-07 ENCOUNTER — OFFICE VISIT (OUTPATIENT)
Dept: CARDIOLOGY | Facility: CLINIC | Age: 62
End: 2017-04-07

## 2017-04-07 ENCOUNTER — TELEPHONE (OUTPATIENT)
Dept: CARDIOLOGY | Facility: CLINIC | Age: 62
End: 2017-04-07

## 2017-04-07 VITALS
HEIGHT: 69 IN | WEIGHT: 190 LBS | DIASTOLIC BLOOD PRESSURE: 90 MMHG | HEART RATE: 66 BPM | SYSTOLIC BLOOD PRESSURE: 141 MMHG | BODY MASS INDEX: 28.14 KG/M2

## 2017-04-07 DIAGNOSIS — I10 ESSENTIAL HYPERTENSION: ICD-10-CM

## 2017-04-07 DIAGNOSIS — I25.119 CORONARY ARTERY DISEASE INVOLVING NATIVE CORONARY ARTERY OF NATIVE HEART WITH ANGINA PECTORIS (HCC): Primary | ICD-10-CM

## 2017-04-07 DIAGNOSIS — R06.09 DYSPNEA ON EXERTION: ICD-10-CM

## 2017-04-07 DIAGNOSIS — E78.5 HYPERLIPIDEMIA WITH TARGET LDL LESS THAN 70: ICD-10-CM

## 2017-04-07 DIAGNOSIS — I73.9 PAD (PERIPHERAL ARTERY DISEASE) (HCC): ICD-10-CM

## 2017-04-07 PROBLEM — I25.10 CORONARY ARTERY DISEASE INVOLVING NATIVE CORONARY ARTERY OF NATIVE HEART WITHOUT ANGINA PECTORIS: Status: ACTIVE | Noted: 2017-04-07

## 2017-04-07 PROCEDURE — 99214 OFFICE O/P EST MOD 30 MIN: CPT | Performed by: NURSE PRACTITIONER

## 2017-04-07 PROCEDURE — 93000 ELECTROCARDIOGRAM COMPLETE: CPT | Performed by: NURSE PRACTITIONER

## 2017-04-07 NOTE — TELEPHONE ENCOUNTER
Neto Andrews,   I wanted to let you know that I saw Mr. Villagran today in office. Dr. Naranjo reviewed his cath reports and recent Lexiscan today. Dr. Naranjo feels that it is ok to proceed with any work up you all are planning for his left common Iliac . We did order a Transthoracic Echo today. Thank you.

## 2017-04-07 NOTE — PROGRESS NOTES
"    Subjective:     Encounter Date:04/07/2017      Patient ID: Kingston Villagran is a 61 y.o. male.    Chief Complaint:  History of Present Illness  Kingston Villagran is a 61 year old  male with a past medical history significant for coronary artery disease s/p PCI/CHYNA OM (12/18/16) and  procedure to the RCA (2/23/17), COPD, who presents today with 2 snf guards present for Cardiology follow up. He is reporting episodes of chest pain occurring up to 5 times per day, described as both exertional and nonexertional sharp midsternal chest pain with radiation into the left side of the chest and left shoulder with associated dyspnea, diaphoresis, and nausea. He states that with minimal exertion, he will become exceedingly short of breath \"I fee like I've been working out for an hour\". He reports that his chest pain has never really resolved since his most recent cath. He reports abdominal distension and difficulty sleeping at night- \"I have to sleep propped up against my wall or I wake up gasping for air\". Denies cough, lower extremity edema, dizziness, syncope. He has been compliant with his medications and denies missing any doses. Patient is a resident of [facility] and it is unlikely that he has missed any doses of Aspirin or Effient.   Patient was recently evaluated by Dr. Roque with Vascular Surgery due to a left common Iliac - plans for LLE angio once cleared by Cardiology.   Lexiscan was recently obtained due to his complaints which revealed positive for posterolateral ischemia.      The following portions of the patient's history were reviewed and updated as appropriate: allergies, current medications, past family history, past medical history, past social history, past surgical history and problem list.  /90 (BP Location: Left arm, Patient Position: Sitting, Cuff Size: Adult)  Pulse 66  Ht 69\" (175.3 cm)  Wt 190 lb (86.2 kg)  BMI 28.06 kg/m2    Current Outpatient Prescriptions:   •  albuterol " (PROVENTIL HFA;VENTOLIN HFA) 108 (90 BASE) MCG/ACT inhaler, Inhale 2 puffs Every 4 (Four) Hours As Needed for Wheezing., Disp: , Rfl:   •  aspirin 81 MG chewable tablet, Chew 1 tablet Daily., Disp: 100 tablet, Rfl: 3  •  atorvastatin (LIPITOR) 40 MG tablet, Take 1 tablet by mouth Every Night., Disp: 90 tablet, Rfl: 3  •  indomethacin (INDOCIN) 50 MG capsule, Take 50 mg by mouth 2 (Two) Times a Day With Meals., Disp: , Rfl:   •  metoprolol tartrate (LOPRESSOR) 25 MG tablet, Take 1 tablet by mouth Every 12 (Twelve) Hours., Disp: 180 tablet, Rfl: 3  •  pantoprazole (PROTONIX) 40 MG EC tablet, Take 40 mg by mouth Daily., Disp: , Rfl:   •  prasugrel (EFFIENT) 10 MG tablet, Take 1 tablet by mouth Daily., Disp: 30 tablet, Rfl: 11  Past Medical History:   Diagnosis Date   • Arthritis    • Asthma    • COPD (chronic obstructive pulmonary disease)    • Diabetes mellitus    • GERD (gastroesophageal reflux disease)    • Glaucoma    • Impaired vision in both eyes    • Irritable bowel syndrome (IBS)    • Myocardial infarction    • Rotator cuff injury     Right side     Past Surgical History:   Procedure Laterality Date   • CARDIAC CATHETERIZATION N/A 12/18/2016    Procedure: Left Heart Cath;  Surgeon: Freedom Naranjo MD;  Location: [facility] CATH INVASIVE LOC[facility];  Service:    • CARDIAC CATHETERIZATION N/A 12/18/2016    Procedure: Left ventriculography;  Surgeon: Freedom Naranjo MD;  Location:  [facility] CATH INVAS[facility];  Service:    • CARDIAC CATHETERIZATION N/A 2/23/2017    Procedure: Chronic Total Occlussion;  Surgeon: Freedom Naranjo MD;  Location:  [facility] CATH INVAS[facility] LOC[facility];  Service:    • CORONARY STENT PLACEMENT     • PERIPHERAL ARTERIAL STENT GRAFT     • ME RT/LT HEART CATHETERS N/A 12/18/2016    Procedure: Percutaneous Coronary Intervention;  Surgeon: Freedom Naranjo MD;  Location:  [facility] CATH INVAS[facility] LOC[facility];  Service: Cardiovascular   • TONSILLECTOMY       No Known Allergies  Social History     Social History   • Marital  status: Single     Spouse name: N/A   • Number of children: N/A   • Years of education: N/A     Occupational History   • Not on file.     Social History Main Topics   • Smoking status: Former Smoker     Quit date: 2008   • Smokeless tobacco: Never Used   • Alcohol use No   • Drug use: No   • Sexual activity: Defer     Other Topics Concern   • Not on file     Social History Narrative     Family History   Problem Relation Age of Onset   • Heart disease Mother    • No Known Problems Father      Review of Systems   Constitution: Positive for malaise/fatigue. Negative for fever and weakness.   Cardiovascular: Positive for chest pain, dyspnea on exertion, orthopnea and paroxysmal nocturnal dyspnea. Negative for leg swelling, near-syncope, palpitations and syncope.   Respiratory: Positive for shortness of breath. Negative for cough, hemoptysis and sputum production.    Skin: Negative for rash.   Musculoskeletal: Negative for falls.   Gastrointestinal: Positive for bloating and nausea (Associated with chest pain). Negative for abdominal pain and vomiting.   Neurological: Negative for dizziness, focal weakness and light-headedness.   Psychiatric/Behavioral: Negative for altered mental status.         ECG 12 Lead  Date/Time: 4/7/2017 9:30 AM  Performed by: JEANETTE HAAS  Authorized by: JEANETTE HAAS   Interpreted by ED physician: CLAY Rao.  Comparison: compared with previous ECG from 2/24/2017  Similar to previous ECG  Rhythm: sinus rhythm  Rate: normal  BPM: 66  Conduction: conduction normal  ST Segments: ST segments normal  T Waves: T waves normal  Clinical impression: normal ECG               Objective:     Physical Exam   Constitutional: He is oriented to person, place, and time. He appears well-developed and well-nourished. He is cooperative. No distress.   Wrist and ankles shackles    HENT:   Head: Normocephalic and atraumatic.   Neck: Carotid bruit is not present.   Cardiovascular: Normal rate, regular  rhythm, S1 normal, S2 normal, normal heart sounds and intact distal pulses.    No murmur heard.  Pulmonary/Chest: Effort normal and breath sounds normal. No accessory muscle usage. No respiratory distress. He exhibits no tenderness.   Abdominal: Soft. Normal appearance and bowel sounds are normal. He exhibits no distension. There is no tenderness.   Musculoskeletal: He exhibits no edema.   Neurological: He is alert and oriented to person, place, and time.   Skin: Skin is warm and dry. No rash noted. He is not diaphoretic.   Psychiatric: He has a normal mood and affect. His speech is normal and behavior is normal.       Lab Review:       Lab Results   Component Value Date    WBC 9.89 02/24/2017    HGB 14.3 02/24/2017    HCT 42.7 02/24/2017    MCV 91.4 02/24/2017     02/24/2017     Lab Results   Component Value Date    GLUCOSE 102 (H) 02/24/2017    CALCIUM 9.3 02/24/2017     02/24/2017    K 3.8 02/24/2017    CO2 28.0 02/24/2017     02/24/2017    BUN 12 02/24/2017    CREATININE 1.03 02/24/2017    EGFRIFNONA 73 02/24/2017    BCR 11.7 02/24/2017    ANIONGAP 11.0 02/24/2017     Lab Results   Component Value Date    CHOL 170 12/19/2016     Lab Results   Component Value Date    TRIG 197 (H) 12/19/2016     Lab Results   Component Value Date    HDL 37 (L) 12/19/2016       Assessment:          Diagnosis Plan   1. Coronary artery disease involving native coronary artery of native heart with angina pectoris     2. Dyspnea on exertion  Adult Transthoracic Echo Complete With Contrast   3. PAD (peripheral artery disease)- left common Iliac  Followed by Vascular surgery with plans for upcoming LLE angio.    4. Hyperlipidemia with target LDL less than 70  Continue Statin therapy    5. Essential hypertension       Plan of care discussed with Dr. Naranjo during the office visit, who reviewed his recent heart cath reports and Lexiscan. Dr. Naranjo felt that the Lexiscan was consistent with an intermediate risk study and  that no further ischemic work up other than an Echo is indicated at this time. Dr. Naranjo feels that it is ok to proceed with any work up Vascular surgery is planning for his left common Iliac .      Plan:       1. Obtain Transthoracic Echo  2. Continue ASA and Effient- patient educated on the importance of lifelong ASA and Effient for a minimum of 1 year without missing a dose. Patient verbalized understanding and stated that he has been compliant with his medications.   3. Healthy Heart Diet   4. Follow up with Dr. Naranjo as scheduled 6/2017- sooner if needed. Call with any questions or concerns.

## 2017-04-11 ENCOUNTER — TELEPHONE (OUTPATIENT)
Dept: VASCULAR SURGERY | Facility: CLINIC | Age: 62
End: 2017-04-11

## 2017-04-14 ENCOUNTER — APPOINTMENT (OUTPATIENT)
Dept: PREADMISSION TESTING | Facility: HOSPITAL | Age: 62
End: 2017-04-14

## 2017-04-17 ENCOUNTER — HOSPITAL ENCOUNTER (OUTPATIENT)
Dept: GENERAL RADIOLOGY | Facility: HOSPITAL | Age: 62
Discharge: HOME | End: 2017-04-17

## 2017-04-17 ENCOUNTER — HOSPITAL ENCOUNTER (OUTPATIENT)
Dept: CARDIOLOGY | Facility: HOSPITAL | Age: 62
Discharge: HOME | End: 2017-04-17
Admitting: NURSE PRACTITIONER

## 2017-04-17 ENCOUNTER — APPOINTMENT (OUTPATIENT)
Dept: PREADMISSION TESTING | Facility: HOSPITAL | Age: 62
End: 2017-04-17

## 2017-04-17 DIAGNOSIS — I73.9 PAD (PERIPHERAL ARTERY DISEASE) (HCC): ICD-10-CM

## 2017-04-17 DIAGNOSIS — R06.09 DYSPNEA ON EXERTION: ICD-10-CM

## 2017-04-17 LAB
ANION GAP SERPL CALCULATED.3IONS-SCNC: 13 MMOL/L (ref 4–13)
APTT PPP: 29.2 SECONDS (ref 24.1–34.8)
BASOPHILS # BLD AUTO: 0.03 10*3/MM3 (ref 0–0.2)
BASOPHILS NFR BLD AUTO: 0.4 % (ref 0–2)
BH CV ECHO MEAS - AO MAX PG (FULL): 1.1 MMHG
BH CV ECHO MEAS - AO MAX PG: 6.9 MMHG
BH CV ECHO MEAS - AO MEAN PG (FULL): 2 MMHG
BH CV ECHO MEAS - AO MEAN PG: 5 MMHG
BH CV ECHO MEAS - AO ROOT AREA (BSA CORRECTED): 1.6
BH CV ECHO MEAS - AO ROOT AREA: 8.6 CM^2
BH CV ECHO MEAS - AO ROOT DIAM: 3.3 CM
BH CV ECHO MEAS - AO V2 MAX: 131 CM/SEC
BH CV ECHO MEAS - AO V2 MEAN: 105 CM/SEC
BH CV ECHO MEAS - AO V2 VTI: 36.5 CM
BH CV ECHO MEAS - AVA(I,A): 2.8 CM^2
BH CV ECHO MEAS - AVA(I,D): 2.8 CM^2
BH CV ECHO MEAS - AVA(V,A): 3.2 CM^2
BH CV ECHO MEAS - AVA(V,D): 3.2 CM^2
BH CV ECHO MEAS - BSA(HAYCOCK): 2.1 M^2
BH CV ECHO MEAS - BSA: 2 M^2
BH CV ECHO MEAS - BZI_BMI: 28.8 KILOGRAMS/M^2
BH CV ECHO MEAS - BZI_METRIC_HEIGHT: 175.3 CM
BH CV ECHO MEAS - BZI_METRIC_WEIGHT: 88.5 KG
BH CV ECHO MEAS - CONTRAST EF 4CH: 59.6 ML/M^2
BH CV ECHO MEAS - EDV(CUBED): 132.7 ML
BH CV ECHO MEAS - EDV(MOD-SP4): 54.2 ML
BH CV ECHO MEAS - EDV(TEICH): 123.8 ML
BH CV ECHO MEAS - EF(CUBED): 70.4 %
BH CV ECHO MEAS - EF(MOD-SP4): 59.6 %
BH CV ECHO MEAS - EF(TEICH): 61.7 %
BH CV ECHO MEAS - ESV(CUBED): 39.3 ML
BH CV ECHO MEAS - ESV(MOD-SP4): 21.9 ML
BH CV ECHO MEAS - ESV(TEICH): 47.4 ML
BH CV ECHO MEAS - FS: 33.3 %
BH CV ECHO MEAS - IVS/LVPW: 1
BH CV ECHO MEAS - IVSD: 1.5 CM
BH CV ECHO MEAS - LA DIMENSION: 3.8 CM
BH CV ECHO MEAS - LA/AO: 1.2
BH CV ECHO MEAS - LV DIASTOLIC VOL/BSA (35-75): 26.5 ML/M^2
BH CV ECHO MEAS - LV MASS(C)D: 332.4 GRAMS
BH CV ECHO MEAS - LV MASS(C)DI: 162.7 GRAMS/M^2
BH CV ECHO MEAS - LV MAX PG: 5.8 MMHG
BH CV ECHO MEAS - LV MEAN PG: 3 MMHG
BH CV ECHO MEAS - LV SYSTOLIC VOL/BSA (12-30): 10.7 ML/M^2
BH CV ECHO MEAS - LV V1 MAX: 120 CM/SEC
BH CV ECHO MEAS - LV V1 MEAN: 79.9 CM/SEC
BH CV ECHO MEAS - LV V1 VTI: 29.3 CM
BH CV ECHO MEAS - LVIDD: 5.1 CM
BH CV ECHO MEAS - LVIDS: 3.4 CM
BH CV ECHO MEAS - LVLD AP4: 8.1 CM
BH CV ECHO MEAS - LVLS AP4: 6.6 CM
BH CV ECHO MEAS - LVOT AREA (M): 3.5 CM^2
BH CV ECHO MEAS - LVOT AREA: 3.5 CM^2
BH CV ECHO MEAS - LVOT DIAM: 2.1 CM
BH CV ECHO MEAS - LVPWD: 1.5 CM
BH CV ECHO MEAS - MR ALIAS VEL: 30.8 CM/SEC
BH CV ECHO MEAS - MR ERO: 0.22 CM^2
BH CV ECHO MEAS - MR FLOW RATE: 123.9 CM^3/SEC
BH CV ECHO MEAS - MR MAX PG: 126.8 MMHG
BH CV ECHO MEAS - MR MAX VEL: 563 CM/SEC
BH CV ECHO MEAS - MR MEAN PG: 90 MMHG
BH CV ECHO MEAS - MR MEAN VEL: 457 CM/SEC
BH CV ECHO MEAS - MR PISA RADIUS: 0.8 CM
BH CV ECHO MEAS - MR PISA: 4 CM^2
BH CV ECHO MEAS - MR VOLUME: 49.7 ML
BH CV ECHO MEAS - MR VTI: 226 CM
BH CV ECHO MEAS - MV A MAX VEL: 79.5 CM/SEC
BH CV ECHO MEAS - MV DEC TIME: 0.31 SEC
BH CV ECHO MEAS - MV E MAX VEL: 75.2 CM/SEC
BH CV ECHO MEAS - MV E/A: 0.95
BH CV ECHO MEAS - SI(AO): 152.8 ML/M^2
BH CV ECHO MEAS - SI(CUBED): 45.7 ML/M^2
BH CV ECHO MEAS - SI(LVOT): 49.7 ML/M^2
BH CV ECHO MEAS - SI(MOD-SP4): 15.8 ML/M^2
BH CV ECHO MEAS - SI(TEICH): 37.4 ML/M^2
BH CV ECHO MEAS - SV(AO): 312.2 ML
BH CV ECHO MEAS - SV(CUBED): 93.3 ML
BH CV ECHO MEAS - SV(LVOT): 101.5 ML
BH CV ECHO MEAS - SV(MOD-SP4): 32.3 ML
BH CV ECHO MEAS - SV(TEICH): 76.4 ML
BUN BLD-MCNC: 10 MG/DL (ref 5–21)
BUN/CREAT SERPL: 8.9 (ref 7–25)
CALCIUM SPEC-SCNC: 9.9 MG/DL (ref 8.4–10.4)
CHLORIDE SERPL-SCNC: 102 MMOL/L (ref 98–110)
CO2 SERPL-SCNC: 27 MMOL/L (ref 24–31)
CREAT BLD-MCNC: 1.12 MG/DL (ref 0.5–1.4)
DEPRECATED RDW RBC AUTO: 43.7 FL (ref 40–54)
E/E' RATIO: 11.7
EOSINOPHIL # BLD AUTO: 0.14 10*3/MM3 (ref 0–0.7)
EOSINOPHIL NFR BLD AUTO: 1.7 % (ref 0–4)
ERYTHROCYTE [DISTWIDTH] IN BLOOD BY AUTOMATED COUNT: 13.2 % (ref 12–15)
GFR SERPL CREATININE-BSD FRML MDRD: 66 ML/MIN/1.73
GLUCOSE BLD-MCNC: 99 MG/DL (ref 70–100)
HCT VFR BLD AUTO: 44.5 % (ref 40–52)
HGB BLD-MCNC: 15 G/DL (ref 14–18)
IMM GRANULOCYTES # BLD: 0.04 10*3/MM3 (ref 0–0.03)
IMM GRANULOCYTES NFR BLD: 0.5 % (ref 0–5)
INR PPP: 1.03 (ref 0.91–1.09)
LEFT ATRIUM VOLUME INDEX: 13.7 ML/M2
LEFT ATRIUM VOLUME: 27.9 CM3
LYMPHOCYTES # BLD AUTO: 1.47 10*3/MM3 (ref 0.72–4.86)
LYMPHOCYTES NFR BLD AUTO: 17.3 % (ref 15–45)
MCH RBC QN AUTO: 30.6 PG (ref 28–32)
MCHC RBC AUTO-ENTMCNC: 33.7 G/DL (ref 33–36)
MCV RBC AUTO: 90.8 FL (ref 82–95)
MONOCYTES # BLD AUTO: 0.66 10*3/MM3 (ref 0.19–1.3)
MONOCYTES NFR BLD AUTO: 7.8 % (ref 4–12)
NEUTROPHILS # BLD AUTO: 6.14 10*3/MM3 (ref 1.87–8.4)
NEUTROPHILS NFR BLD AUTO: 72.3 % (ref 39–78)
PLATELET # BLD AUTO: 236 10*3/MM3 (ref 130–400)
PMV BLD AUTO: 10.5 FL (ref 6–12)
POTASSIUM BLD-SCNC: 3.9 MMOL/L (ref 3.5–5.3)
PROTHROMBIN TIME: 13.8 SECONDS (ref 11.9–14.6)
RBC # BLD AUTO: 4.9 10*6/MM3 (ref 4.8–5.9)
SODIUM BLD-SCNC: 142 MMOL/L (ref 135–145)
WBC NRBC COR # BLD: 8.48 10*3/MM3 (ref 4.8–10.8)

## 2017-04-17 PROCEDURE — 85025 COMPLETE CBC W/AUTO DIFF WBC: CPT

## 2017-04-17 PROCEDURE — 85610 PROTHROMBIN TIME: CPT

## 2017-04-17 PROCEDURE — 85730 THROMBOPLASTIN TIME PARTIAL: CPT

## 2017-04-17 PROCEDURE — 93306 TTE W/DOPPLER COMPLETE: CPT | Performed by: INTERNAL MEDICINE

## 2017-04-17 PROCEDURE — 36415 COLL VENOUS BLD VENIPUNCTURE: CPT

## 2017-04-17 PROCEDURE — 71020 HC CHEST PA AND LATERAL: CPT

## 2017-04-17 PROCEDURE — 80048 BASIC METABOLIC PNL TOTAL CA: CPT

## 2017-04-17 PROCEDURE — 93005 ELECTROCARDIOGRAM TRACING: CPT

## 2017-04-17 PROCEDURE — 93306 TTE W/DOPPLER COMPLETE: CPT

## 2017-04-17 PROCEDURE — 93010 ELECTROCARDIOGRAM REPORT: CPT | Performed by: INTERNAL MEDICINE

## 2017-04-17 RX ORDER — BUSPIRONE HYDROCHLORIDE 10 MG/1
10 TABLET ORAL 3 TIMES DAILY
COMMUNITY
End: 2017-05-05 | Stop reason: ALTCHOICE

## 2017-04-17 RX ORDER — DOCUSATE SODIUM 100 MG/1
100 CAPSULE, LIQUID FILLED ORAL 2 TIMES DAILY
COMMUNITY

## 2017-04-17 RX ORDER — NABUMETONE 500 MG/1
500 TABLET, FILM COATED ORAL 2 TIMES DAILY PRN
COMMUNITY
End: 2017-06-09 | Stop reason: ALTCHOICE

## 2017-04-17 NOTE — DISCHARGE INSTRUCTIONS
DAY OF SURGERY INSTRUCTIONS        YOUR SURGEON: ***    PROCEDURE: ***    DATE OF SURGERY: ***    ARRIVAL TIME: AS DIRECTED BY OFFICE    DAY OF SURGERY TAKE ONLY THESE MEDICATIONS: ***METOPROLOL        BEFORE YOU COME TO THE HOSPITAL  (Pre-op instructions)  • Do not eat, drink, smoke or chew gum after midnight the night before surgery.  This also includes no mints.  • Morning of surgery take only the medicines you have been instructed with a sip of water unless otherwise instructed  by your physician.  • Do not shave, wear makeup or dark nail polish.  • Remove all jewelry including rings.  • Leave anything you consider valuable at home.  • Leave your suitcase in the car until after your surgery.  • Bring the following with you if applicable:  o Picture ID and insurance, Medicare or Medicaid cards  o Co-pay/deductible required by insurance (cash, check, credit card)  o Medications (no narcotics) in original bottles (not a list) including all over-the-counter medications.  o Copy of advance directive, living will or power-of- documents if not brought to PAT  o CPAP or BIPAP mask and tubing  o Skin prep instruction sheet  o Relaxation aids (MP3 player, book, magazine)  • Confirm your arrival time with you surgeon the day before your surgery (surgery times are subject to change)  • On the day of surgery check in at registration located at the main entrance of the hospital.       Outpatient Surgery Guidelines, Adult  Outpatient procedures are those for which the person having the procedure is allowed to go home the same day as the procedure. Various procedures are done on an outpatient basis. You should follow some general guidelines if you will be having an outpatient procedure.  LET YOUR HEALTH CARE PROVIDER KNOW ABOUT:  · Any allergies you have.  · All medicines you are taking, including vitamins, herbs, eye drops, creams, and over-the-counter medicines.  · Previous problems you or members of your family  have had with the use of anesthetics.  · Any blood disorders you have.  · Previous surgeries you have had.  · Medical conditions you have.  RISKS AND COMPLICATIONS  Your health care provider will discuss possible risks and complications with you before surgery. Common risks and complications include:    · Problems due to the use of anesthetics.  · Blood loss and replacement (does not apply to minor surgical procedures).  · Temporary increase in pain due to surgery.  · Uncorrected pain or problems that the surgery was meant to correct.  · Infection.  · New damage.  BEFORE THE PROCEDURE  · Ask your health care provider about changing or stopping your regular medicines. You may need to stop taking certain medicines in the days or weeks before the procedure.  · Stop smoking at least 2 weeks before surgery. This lowers your risk for complications during and after surgery. Ask your health care provider for help with this if needed.  · Eat your usual meals and a light supper the day before surgery. Continue fluid intake. Do not drink alcohol.  · Do not eat or drink after midnight the night before your surgery.   · Arrange for someone to take you home and to stay with you for 24 hours after the procedure. Medicine given for your procedure may affect your ability to drive or to care for yourself.  · Call your health care provider's office if you develop an illness or problem that may prevent you from safely having your procedure.  AFTER THE PROCEDURE  After surgery, you will be taken to a recovery area, where your progress will be monitored. If there are no complications, you will be allowed to go home when you are awake, stable, and taking fluids well. You may have numbness around the surgical site. Healing will take some time. You will have tenderness at the surgical site and may have some swelling and bruising. You may also have some nausea.  HOME CARE INSTRUCTIONS  · Do not drive for 24 hours, or as directed by your  health care provider. Do not drive while taking prescription pain medicines.  · Do not drink alcohol for 24 hours.  · Do not make important decisions or sign legal documents for 24 hours.  · You may resume a normal diet and activities as directed.  · Do not lift anything heavier than 10 pounds (4.5 kg) or play contact sports until your health care provider says it is okay.  · Change your bandages (dressings) as directed.  · Only take over-the-counter or prescription medicines as directed by your health care provider.  · Follow up with your health care provider as directed.  SEEK MEDICAL CARE IF:  · You have increased bleeding (more than a small spot) from the surgical site.  · You have redness, swelling, or increasing pain in the wound.  · You see pus coming from the wound.  · You have a fever.  · You notice a bad smell coming from the wound or dressing.  · You feel lightheaded or faint.  · You develop a rash.  · You have trouble breathing.  · You develop allergies.  MAKE SURE YOU:  · Understand these instructions.  · Will watch your condition.  · Will get help right away if you are not doing well or get worse.     This information is not intended to replace advice given to you by your health care provider. Make sure you discuss any questions you have with your health care provider.     Document Released: 09/12/2002 Document Revised: 05/03/2016 Document Reviewed: 05/22/2014  [employer] Interactive Patient Education ©2016 [employer]       Fall Prevention in Hospitals, Adult  As a hospital patient, your condition and the treatments you receive can increase your risk for falls. Some additional risk factors for falls in a hospital include:  · Being in an unfamiliar environment.  · Being on bed rest.  · Your surgery.  · Taking certain medicines.  · Your tubing requirements, such as intravenous (IV) therapy or catheters.  It is important that you learn how to decrease fall risks while at the hospital. Below are important  tips that can help prevent falls.  SAFETY TIPS FOR PREVENTING FALLS  Talk about your risk of falling.  · Ask your health care provider why you are at risk for falling. Is it your medicine, illness, tubing placement, or something else?  · Make a plan with your health care provider to keep you safe from falls.  · Ask your health care provider or pharmacist about side effects of your medicines. Some medicines can make you dizzy or affect your coordination.  Ask for help.  · Ask for help before getting out of bed. You may need to press your call button.  · Ask for assistance in getting safely to the toilet.  · Ask for a walker or cane to be put at your bedside. Ask that most of the side rails on your bed be placed up before your health care provider leaves the room.  · Ask family or friends to sit with you.  · Ask for things that are out of your reach, such as your glasses, hearing aids, telephone, bedside table, or call button.  Follow these tips to avoid falling:  · Stay lying or seated, rather than standing, while waiting for help.  · Wear rubber-soled slippers or shoes whenever you walk in the hospital.  · Avoid quick, sudden movements.  ¨ Change positions slowly.  ¨ Sit on the side of your bed before standing.  ¨ Stand up slowly and wait before you start to walk.  · Let your health care provider know if there is a spill on the floor.  · Pay careful attention to the medical equipment, electrical cords, and tubes around you.  · When you need help, use your call button by your bed or in the bathroom. Wait for one of your health care providers to help you.  · If you feel dizzy or unsure of your footing, return to bed and wait for assistance.  · Avoid being distracted by the TV, telephone, or another person in your room.  · Do not lean or support yourself on rolling objects, such as IV poles or bedside tables.     This information is not intended to replace advice given to you by your health care provider. Make sure you  discuss any questions you have with your health care provider.     Document Released: 12/15/2001 Document Revised: 01/08/2016 Document Reviewed: 08/25/2013  [employer] Interactive Patient Education ©2016 [employer]       Surgical Site Infections FAQs  What is a Surgical Site Infection (SSI)?  A surgical site infection is an infection that occurs after surgery in the part of the body where the surgery took place. Most patients who have surgery do not develop an infection. However, infections develop in about 1 to 3 out of every 100 patients who have surgery.  Some of the common symptoms of a surgical site infection are:  · Redness and pain around the area where you had surgery  · Drainage of cloudy fluid from your surgical wound  · Fever  Can SSIs be treated?  Yes. Most surgical site infections can be treated with antibiotics. The antibiotic given to you depends on the bacteria (germs) causing the infection. Sometimes patients with SSIs also need another surgery to treat the infection.  What are some of the things that hospitals are doing to prevent SSIs?  To prevent SSIs, doctors, nurses, and other healthcare providers:  · Clean their hands and arms up to their elbows with an antiseptic agent just before the surgery.  · Clean their hands with soap and water or an alcohol-based hand rub before and after caring for each patient.  · May remove some of your hair immediately before your surgery using electric clippers if the hair is in the same area where the procedure will occur. They should not shave you with a razor.  · Wear special hair covers, masks, gowns, and gloves during surgery to keep the surgery area clean.  · Give you antibiotics before your surgery starts. In most cases, you should get antibiotics within 60 minutes before the surgery starts and the antibiotics should be stopped within 24 hours after surgery.  · Clean the skin at the site of your surgery with a special soap that kills germs.  What can I do  to help prevent SSIs?  Before your surgery:  · Tell your doctor about other medical problems you may have. Health problems such as allergies, diabetes, and obesity could affect your surgery and your treatment.  · Quit smoking. Patients who smoke get more infections. Talk to your doctor about how you can quit before your surgery.  · Do not shave near where you will have surgery. Shaving with a razor can irritate your skin and make it easier to develop an infection.  At the time of your surgery:  · Speak up if someone tries to shave you with a razor before surgery. Ask why you need to be shaved and talk with your surgeon if you have any concerns.  · Ask if you will get antibiotics before surgery.  After your surgery:  · Make sure that your healthcare providers clean their hands before examining you, either with soap and water or an alcohol-based hand rub.  · If you do not see your providers clean their hands, please ask them to do so.  · Family and friends who visit you should not touch the surgical wound or dressings.  · Family and friends should clean their hands with soap and water or an alcohol-based hand rub before and after visiting you. If you do not see them clean their hands, ask them to clean their hands.  What do I need to do when I go home from the hospital?  · Before you go home, your doctor or nurse should explain everything you need to know about taking care of your wound. Make sure you understand how to care for your wound before you leave the hospital.  · Always clean your hands before and after caring for your wound.  · Before you go home, make sure you know who to contact if you have questions or problems after you get home.  · If you have any symptoms of an infection, such as redness and pain at the surgery site, drainage, or fever, call your doctor immediately.  If you have additional questions, please ask your doctor or nurse.  Developed and co-sponsored by The Society for Healthcare Epidemiology  of [place] (SHEA); Infectious Diseases Society of [place] (IDSA); American Hospital Association; Association for Professionals in Infection Control and Epidemiology (APIC); Centers for Disease Control and Prevention (CDC); and The Joint Commission.     This information is not intended to replace advice given to you by your health care provider. Make sure you discuss any questions you have with your health care provider.     Document Released: 12/23/2014 Document Revised: 01/08/2016 Document Reviewed: 03/02/2016  [employer] Interactive Patient Education ©2016 [employer].       [facility]  CHG 4% Patient Instruction Sheet    Preparing the Skin Before Surgery  Preparing or “prepping” skin before surgery can reduce the risk of infection at the surgical site. To make the process easier,[facility] has chosen 4% Chlorhexidine Gluconate (CHG) antiseptic solution.   The steps below outline the prepping process and should be carefully followed.                                                                                                                                                      Prep the skin at the following time(s):                                                      We recommend you shower the night before surgery, and again the morning of surgery with the 4% CHG antiseptic solution using half of the bottle and a cloth each time.  Dress in clean clothes/sleepwear after showering.  See instructions below for application.          Do not apply any lotions or moisturizers.       Do not shave the area to be prepped for at least 2 days prior to surgery.    Clipping the hair may be done immediately prior to your surgery at the hospital    if needed.    Directions:  Thoroughly rinse your body with water.  Apply 4% CHG to a cloth and wash skin gently, paying special attention to the operative site.  Rinse again thoroughly.  Once you have begun using this product do not apply anything else to your skin. If itching  or redness persists, rinse affected areas and discontinue use.    When using this product:  • Keep out of eyes, ears, and mouth.  • If solution should contact these areas, rinse out promptly and thoroughly with water.  • For external use only.  • Do not use in genital area, on your face or head.      PATIENT/FAMILY/RESPONSIBLE PARTY VERBALIZES UNDERSTANDING OF ABOVE EDUCATION

## 2017-04-18 ENCOUNTER — APPOINTMENT (OUTPATIENT)
Dept: CARDIOLOGY | Facility: HOSPITAL | Age: 62
End: 2017-04-18

## 2017-04-18 NOTE — H&P
04/18/2017      Freedom Naranjo MD  [address]  [place] [address]     Kingston Villagran  1955          Chief Complaint   Patient presents with   • Follow-up       3 wk hospital f/u/schedule angiogram of LL extremity         Dear Freedom Naranjo MD:        HPI  I had the pleasure of seeing your patient Kingston Villagran in the office today. Thank you kindly for this consultation. As you recall, Kingston Villagran is a 61 y.o. male who you are currently following for coronary artery disease. He was compplaining of chest pain with a known  of RCA and underwent a heart cath in which he had a successful  procedure. During the procedure however Dr. Naranjo revealed a total occlusion of left common iliac His left leg has been bothering him for 2-3 years, but he was told he had sciatica. He does complain of some intermittent chest pain throughout the day, even worsening since his heart cath.  He is currently maintained on Effient and Aspirin.      Medical History    Past Medical History   Diagnosis Date   • Arthritis     • Diabetes mellitus     • GERD (gastroesophageal reflux disease)     • Impaired vision in both eyes     • Irritable bowel syndrome (IBS)     • Rotator cuff injury         Right side             Surgical History           Past Surgical History   Procedure Laterality Date   • Tonsillectomy       • Pr rt/lt heart catheters N/A 12/18/2016       Procedure: Percutaneous Coronary Intervention; Surgeon: Freedom Naranjo MD; Location:  [facility]; Service: Cardiovascular   • Cardiac catheterization N/A 12/18/2016       Procedure: Left Heart Cath; Surgeon: Freedom Naranjo MD; Location:  [facility]; Service:    • Cardiac catheterization N/A 12/18/2016       Procedure: Left ventriculography; Surgeon: Freedom Naranjo MD; Location:  [facility]; Service:    • Cardiac catheterization N/A 2/23/2017       Procedure: Chronic Total Occlussion; Surgeon: Freedom Naranjo MD;  Location: [facility] LOCATION; Service:                   Family History   Problem Relation Age of Onset   • Heart disease Mother            Social History    Social History            Social History   • Marital status: Single       Spouse name: N/A   • Number of children: N/A   • Years of education: N/A          Occupational History   • Not on file.            Social History Main Topics   • Smoking status: Former Smoker       Quit date: 2008   • Smokeless tobacco: Former User   • Alcohol use No   • Drug use: No   • Sexual activity: Defer           Other Topics Concern   • Not on file      Social History Narrative            No Known Allergies             Prior to Admission medications    Medication Sig Start Date End Date Taking? Authorizing Provider   albuterol (PROVENTIL HFA;VENTOLIN HFA) 108 (90 BASE) MCG/ACT inhaler Inhale 2 puffs Every 4 (Four) Hours As Needed for Wheezing.     Yes Historical Provider, MD   aspirin 81 MG chewable tablet Chew 1 tablet Daily. 12/19/16   Yes Freedom Naranjo MD   atorvastatin (LIPITOR) 40 MG tablet Take 1 tablet by mouth Every Night. 12/21/16   Yes Freedom Naranjo MD   docusate sodium (COLACE) 100 MG capsule Take 100 mg by mouth Daily.     Yes Historical Provider, MD   ibuprofen (ADVIL,MOTRIN) 600 MG tablet Take 600 mg by mouth 2 (Two) Times a Day.     Yes Historical Provider, MD   indomethacin (INDOCIN) 50 MG capsule Take 50 mg by mouth 2 (Two) Times a Day With Meals.     Yes Historical Provider, MD   metoprolol tartrate (LOPRESSOR) 25 MG tablet Take 1 tablet by mouth Every 12 (Twelve) Hours. 12/21/16   Yes Freedom Naranjo MD   pantoprazole (PROTONIX) 40 MG EC tablet Take 40 mg by mouth Daily.     Yes Historical Provider, MD   prasugrel (EFFIENT) 10 MG tablet Take 1 tablet by mouth Daily. 12/21/16   Yes Freedom Naranjo MD         Review of Systems   Constitutional: Negative.   HENT: Negative.   Eyes: Negative.   Respiratory: Negative. Negative for shortness of breath.  "  Cardiovascular: Positive for chest pain (intermittently).   Left leg pain   Gastrointestinal: Negative.   Endocrine: Negative.   Genitourinary: Negative.   Musculoskeletal: Negative.   Skin: Negative.   Allergic/Immunologic: Negative.   Neurological: Negative.   Hematological: Negative.   Psychiatric/Behavioral: Negative.   All other systems reviewed and are negative.             Visit Vitals   • /88   • Pulse 63   • Ht 69\" (175.3 cm)   • Wt 194 lb (88 kg)   • BMI 28.65 kg/m2      Physical Exam  Constitutional: He is oriented to person, place, and time. He appears well-developed and well-nourished. No distress.   HENT:   Head: Normocephalic and atraumatic.   Mouth/Throat: Oropharynx is clear and moist and mucous membranes are normal.   Eyes: Pupils are equal, round, and reactive to light.   Neck: Normal range of motion. No JVD present. Carotid bruit is not present.   Cardiovascular: Normal rate, regular rhythm, S1 normal, S2 normal and normal heart sounds. Exam reveals no gallop and no friction rub.   No murmur heard.  Pulses:  Femoral pulses are 2+ on the right side, and 0 on the left side.  Popliteal pulses are 2+ on the right side, and 0 on the left side.   Dorsalis pedis pulses are 2+ on the right side, and 0 on the left side.   Posterior tibial pulses are 2+ on the right side, and 0 on the left side.   Pulmonary/Chest: Effort normal and breath sounds normal.   Abdominal: Soft. Normal appearance, normal aorta and bowel sounds are normal. He exhibits no abdominal bruit. There is no hepatosplenomegaly. There is no tenderness.   Musculoskeletal: Normal range of motion.   Ankle and wrist chains in place     Vascular Status - His exam exhibits no right foot edema. His exam exhibits no left foot edema.  Neurological: He is alert and oriented to person, place, and time. He has normal strength. No cranial nerve deficit.   Skin: Skin is warm, dry and intact. He is not diaphoretic.   Psychiatric: He has a normal " mood and affect. His behavior is normal. Judgment and thought content normal. Cognition and memory are normal.             Patient Active Problem List   Diagnosis   • Hyperlipidemia with target LDL less than 70   • Chronic total occlusion of native coronary artery   • Angina, class III             ICD-10-CM ICD-9-CM   1. PAD (peripheral artery disease) I73.9 443.9   2. Chronic total occlusion of native coronary artery I25.10 414.01     I25.82 414.2   3. Hyperlipidemia with target LDL less than 70 E78.5 272.4         Plan: After thoroughly evaluating Kingston Villagran, I believe the best course of action is to proceed with an angiogram of the left lower extremity. However, he is complaining of chest pain intermittently. Dr. Roque did discuss with Dr. Naranjo, and instructed to call his office to schedule a nuclear stress test. I did speak with CLAY Medellin regarding. Risks of angiogram were discussed. These include, but are not limited to, bleeding, infection, vessel damage, nerve damage, embolus, and loss of limb. The patient understands these risks and wishes to proceed with procedure. After this is performed, we will schedule his angiogram.     Thank you for allowing me to participate in the care of your patient. Please do not hesitate with any questions or concerns. I will keep you aware of any further encounters with Kingston Villagran.           Sincerely yours,            CLAY Naqvi APRN

## 2017-04-21 ENCOUNTER — ANESTHESIA EVENT (OUTPATIENT)
Dept: PERIOP | Facility: HOSPITAL | Age: 62
End: 2017-04-21

## 2017-04-21 ENCOUNTER — ANESTHESIA (OUTPATIENT)
Dept: PERIOP | Facility: HOSPITAL | Age: 62
End: 2017-04-21

## 2017-04-21 ENCOUNTER — HOSPITAL ENCOUNTER (OUTPATIENT)
Facility: HOSPITAL | Age: 62
Discharge: HOME | End: 2017-04-21
Attending: SURGERY | Admitting: SURGERY

## 2017-04-21 ENCOUNTER — APPOINTMENT (OUTPATIENT)
Dept: INTERVENTIONAL RADIOLOGY/VASCULAR | Facility: HOSPITAL | Age: 62
End: 2017-04-21

## 2017-04-21 VITALS
HEIGHT: 69 IN | RESPIRATION RATE: 16 BRPM | DIASTOLIC BLOOD PRESSURE: 68 MMHG | BODY MASS INDEX: 27.05 KG/M2 | OXYGEN SATURATION: 95 % | TEMPERATURE: 97.3 F | SYSTOLIC BLOOD PRESSURE: 120 MMHG | HEART RATE: 82 BPM | WEIGHT: 182.6 LBS

## 2017-04-21 DIAGNOSIS — I73.9 PAD (PERIPHERAL ARTERY DISEASE) (HCC): ICD-10-CM

## 2017-04-21 LAB
ABO GROUP BLD: NORMAL
BLD GP AB SCN SERPL QL: NEGATIVE
GLUCOSE BLDC GLUCOMTR-MCNC: 81 MG/DL (ref 70–130)
GLUCOSE BLDC GLUCOMTR-MCNC: 87 MG/DL (ref 70–130)
RH BLD: NEGATIVE

## 2017-04-21 PROCEDURE — 25010000002 FENTANYL CITRATE (PF) 100 MCG/2ML SOLUTION: Performed by: NURSE ANESTHETIST, CERTIFIED REGISTERED

## 2017-04-21 PROCEDURE — C1887 CATHETER, GUIDING: HCPCS | Performed by: SURGERY

## 2017-04-21 PROCEDURE — C1876 STENT, NON-COA/NON-COV W/DEL: HCPCS | Performed by: SURGERY

## 2017-04-21 PROCEDURE — C1894 INTRO/SHEATH, NON-LASER: HCPCS | Performed by: SURGERY

## 2017-04-21 PROCEDURE — 86900 BLOOD TYPING SEROLOGIC ABO: CPT | Performed by: NURSE PRACTITIONER

## 2017-04-21 PROCEDURE — 25010000002 MIDAZOLAM PER 1 MG: Performed by: NURSE ANESTHETIST, CERTIFIED REGISTERED

## 2017-04-21 PROCEDURE — C1769 GUIDE WIRE: HCPCS | Performed by: SURGERY

## 2017-04-21 PROCEDURE — 82962 GLUCOSE BLOOD TEST: CPT

## 2017-04-21 PROCEDURE — 25010000002 PROPOFOL 1000 MG/ML EMULSION: Performed by: NURSE ANESTHETIST, CERTIFIED REGISTERED

## 2017-04-21 PROCEDURE — 86901 BLOOD TYPING SEROLOGIC RH(D): CPT | Performed by: NURSE PRACTITIONER

## 2017-04-21 PROCEDURE — 37221 PR REVSC OPN/PRQ ILIAC ART W/STNT PLMT & ANGIOPLSTY: CPT | Performed by: SURGERY

## 2017-04-21 PROCEDURE — 25010000002 ONDANSETRON PER 1 MG: Performed by: SURGERY

## 2017-04-21 PROCEDURE — 25010000002 HEPARIN (PORCINE) PER 1000 UNITS: Performed by: SURGERY

## 2017-04-21 PROCEDURE — 25010000002 MIDAZOLAM PER 1 MG: Performed by: ANESTHESIOLOGY

## 2017-04-21 PROCEDURE — 76000 FLUOROSCOPY <1 HR PHYS/QHP: CPT

## 2017-04-21 PROCEDURE — C1725 CATH, TRANSLUMIN NON-LASER: HCPCS | Performed by: SURGERY

## 2017-04-21 PROCEDURE — 0 IOPAMIDOL 61 % SOLUTION: Performed by: SURGERY

## 2017-04-21 PROCEDURE — C1760 CLOSURE DEV, VASC: HCPCS | Performed by: SURGERY

## 2017-04-21 PROCEDURE — 75710 ARTERY X-RAYS ARM/LEG: CPT

## 2017-04-21 PROCEDURE — 25010000003 CEFAZOLIN PER 500 MG: Performed by: NURSE PRACTITIONER

## 2017-04-21 PROCEDURE — 25010000002 HEPARIN (PORCINE) PER 1000 UNITS: Performed by: NURSE ANESTHETIST, CERTIFIED REGISTERED

## 2017-04-21 PROCEDURE — 86850 RBC ANTIBODY SCREEN: CPT | Performed by: NURSE PRACTITIONER

## 2017-04-21 PROCEDURE — 36200 PLACE CATHETER IN AORTA: CPT | Performed by: SURGERY

## 2017-04-21 DEVICE — OMNILINK ELITE VASCULAR BALLOON-EXPANDABLE STENT SYSTEM 8.0 MM X 39 MM X 80 CM OVER-THE-WIRE
Type: IMPLANTABLE DEVICE | Site: GROIN | Status: FUNCTIONAL
Brand: OMNILINK ELITE

## 2017-04-21 RX ORDER — MORPHINE SULFATE 2 MG/ML
2 INJECTION, SOLUTION INTRAMUSCULAR; INTRAVENOUS AS NEEDED
Status: DISCONTINUED | OUTPATIENT
Start: 2017-04-21 | End: 2017-04-21 | Stop reason: HOSPADM

## 2017-04-21 RX ORDER — PROMETHAZINE HYDROCHLORIDE 25 MG/ML
12.5 INJECTION, SOLUTION INTRAMUSCULAR; INTRAVENOUS ONCE AS NEEDED
Status: DISCONTINUED | OUTPATIENT
Start: 2017-04-21 | End: 2017-04-21 | Stop reason: HOSPADM

## 2017-04-21 RX ORDER — HEPARIN SODIUM 1000 [USP'U]/ML
INJECTION, SOLUTION INTRAVENOUS; SUBCUTANEOUS AS NEEDED
Status: DISCONTINUED | OUTPATIENT
Start: 2017-04-21 | End: 2017-04-21 | Stop reason: SURG

## 2017-04-21 RX ORDER — ONDANSETRON 2 MG/ML
4 INJECTION INTRAMUSCULAR; INTRAVENOUS AS NEEDED
Status: DISCONTINUED | OUTPATIENT
Start: 2017-04-21 | End: 2017-04-21 | Stop reason: HOSPADM

## 2017-04-21 RX ORDER — LABETALOL HYDROCHLORIDE 5 MG/ML
5 INJECTION, SOLUTION INTRAVENOUS
Status: DISCONTINUED | OUTPATIENT
Start: 2017-04-21 | End: 2017-04-21 | Stop reason: HOSPADM

## 2017-04-21 RX ORDER — PRASUGREL 10 MG/1
10 TABLET, FILM COATED ORAL DAILY
Status: DISCONTINUED | OUTPATIENT
Start: 2017-04-21 | End: 2017-04-21 | Stop reason: HOSPADM

## 2017-04-21 RX ORDER — BUPIVACAINE HYDROCHLORIDE 5 MG/ML
INJECTION, SOLUTION EPIDURAL; INTRACAUDAL AS NEEDED
Status: DISCONTINUED | OUTPATIENT
Start: 2017-04-21 | End: 2017-04-21 | Stop reason: HOSPADM

## 2017-04-21 RX ORDER — METOCLOPRAMIDE HYDROCHLORIDE 5 MG/ML
5 INJECTION INTRAMUSCULAR; INTRAVENOUS
Status: DISCONTINUED | OUTPATIENT
Start: 2017-04-21 | End: 2017-04-21 | Stop reason: HOSPADM

## 2017-04-21 RX ORDER — MIDAZOLAM HYDROCHLORIDE 1 MG/ML
2 INJECTION INTRAMUSCULAR; INTRAVENOUS
Status: DISCONTINUED | OUTPATIENT
Start: 2017-04-21 | End: 2017-04-21 | Stop reason: HOSPADM

## 2017-04-21 RX ORDER — SODIUM CHLORIDE, SODIUM LACTATE, POTASSIUM CHLORIDE, CALCIUM CHLORIDE 600; 310; 30; 20 MG/100ML; MG/100ML; MG/100ML; MG/100ML
100 INJECTION, SOLUTION INTRAVENOUS CONTINUOUS
Status: DISCONTINUED | OUTPATIENT
Start: 2017-04-21 | End: 2017-04-21 | Stop reason: HOSPADM

## 2017-04-21 RX ORDER — MEPERIDINE HYDROCHLORIDE 25 MG/ML
12.5 INJECTION INTRAMUSCULAR; INTRAVENOUS; SUBCUTANEOUS
Status: DISCONTINUED | OUTPATIENT
Start: 2017-04-21 | End: 2017-04-21 | Stop reason: HOSPADM

## 2017-04-21 RX ORDER — IPRATROPIUM BROMIDE AND ALBUTEROL SULFATE 2.5; .5 MG/3ML; MG/3ML
3 SOLUTION RESPIRATORY (INHALATION) ONCE AS NEEDED
Status: DISCONTINUED | OUTPATIENT
Start: 2017-04-21 | End: 2017-04-21 | Stop reason: HOSPADM

## 2017-04-21 RX ORDER — IPRATROPIUM BROMIDE AND ALBUTEROL SULFATE 2.5; .5 MG/3ML; MG/3ML
3 SOLUTION RESPIRATORY (INHALATION) ONCE
Status: COMPLETED | OUTPATIENT
Start: 2017-04-21 | End: 2017-04-21

## 2017-04-21 RX ORDER — HYDRALAZINE HYDROCHLORIDE 20 MG/ML
5 INJECTION INTRAMUSCULAR; INTRAVENOUS
Status: DISCONTINUED | OUTPATIENT
Start: 2017-04-21 | End: 2017-04-21 | Stop reason: HOSPADM

## 2017-04-21 RX ORDER — HYDROCODONE BITARTRATE AND ACETAMINOPHEN 5; 325 MG/1; MG/1
1 TABLET ORAL EVERY 4 HOURS PRN
Status: DISCONTINUED | OUTPATIENT
Start: 2017-04-21 | End: 2017-04-21 | Stop reason: HOSPADM

## 2017-04-21 RX ORDER — NALOXONE HCL 0.4 MG/ML
0.04 VIAL (ML) INJECTION AS NEEDED
Status: DISCONTINUED | OUTPATIENT
Start: 2017-04-21 | End: 2017-04-21 | Stop reason: HOSPADM

## 2017-04-21 RX ORDER — FENTANYL CITRATE 50 UG/ML
INJECTION, SOLUTION INTRAMUSCULAR; INTRAVENOUS AS NEEDED
Status: DISCONTINUED | OUTPATIENT
Start: 2017-04-21 | End: 2017-04-21 | Stop reason: SURG

## 2017-04-21 RX ORDER — FLUMAZENIL 0.1 MG/ML
0.2 INJECTION INTRAVENOUS AS NEEDED
Status: DISCONTINUED | OUTPATIENT
Start: 2017-04-21 | End: 2017-04-21 | Stop reason: HOSPADM

## 2017-04-21 RX ORDER — MIDAZOLAM HYDROCHLORIDE 1 MG/ML
INJECTION INTRAMUSCULAR; INTRAVENOUS AS NEEDED
Status: DISCONTINUED | OUTPATIENT
Start: 2017-04-21 | End: 2017-04-21 | Stop reason: SURG

## 2017-04-21 RX ORDER — ONDANSETRON 2 MG/ML
4 INJECTION INTRAMUSCULAR; INTRAVENOUS ONCE AS NEEDED
Status: COMPLETED | OUTPATIENT
Start: 2017-04-21 | End: 2017-04-21

## 2017-04-21 RX ORDER — MIDAZOLAM HYDROCHLORIDE 1 MG/ML
1 INJECTION INTRAMUSCULAR; INTRAVENOUS
Status: DISCONTINUED | OUTPATIENT
Start: 2017-04-21 | End: 2017-04-21 | Stop reason: HOSPADM

## 2017-04-21 RX ORDER — SODIUM CHLORIDE 0.9 % (FLUSH) 0.9 %
1-10 SYRINGE (ML) INJECTION AS NEEDED
Status: DISCONTINUED | OUTPATIENT
Start: 2017-04-21 | End: 2017-04-21 | Stop reason: HOSPADM

## 2017-04-21 RX ADMIN — HYDROCODONE BITARTRATE AND ACETAMINOPHEN 1 TABLET: 5; 325 TABLET ORAL at 18:46

## 2017-04-21 RX ADMIN — FENTANYL CITRATE 50 MCG: 50 INJECTION INTRAMUSCULAR; INTRAVENOUS at 10:59

## 2017-04-21 RX ADMIN — MIDAZOLAM HYDROCHLORIDE 1 MG: 1 INJECTION, SOLUTION INTRAMUSCULAR; INTRAVENOUS at 11:26

## 2017-04-21 RX ADMIN — MIDAZOLAM HYDROCHLORIDE 2 MG: 1 INJECTION, SOLUTION INTRAMUSCULAR; INTRAVENOUS at 10:53

## 2017-04-21 RX ADMIN — ONDANSETRON 4 MG: 2 INJECTION INTRAMUSCULAR; INTRAVENOUS at 12:50

## 2017-04-21 RX ADMIN — MIDAZOLAM HYDROCHLORIDE 3 MG: 1 INJECTION, SOLUTION INTRAMUSCULAR; INTRAVENOUS at 10:59

## 2017-04-21 RX ADMIN — PROPOFOL 25 MCG/KG/MIN: 10 INJECTION, EMULSION INTRAVENOUS at 11:01

## 2017-04-21 RX ADMIN — FENTANYL CITRATE 50 MCG: 50 INJECTION INTRAMUSCULAR; INTRAVENOUS at 12:09

## 2017-04-21 RX ADMIN — FENTANYL CITRATE 50 MCG: 50 INJECTION INTRAMUSCULAR; INTRAVENOUS at 11:55

## 2017-04-21 RX ADMIN — SODIUM CHLORIDE, POTASSIUM CHLORIDE, SODIUM LACTATE AND CALCIUM CHLORIDE 100 ML/HR: 600; 310; 30; 20 INJECTION, SOLUTION INTRAVENOUS at 10:18

## 2017-04-21 RX ADMIN — LIDOCAINE HYDROCHLORIDE 0.5 ML: 10 INJECTION, SOLUTION EPIDURAL; INFILTRATION; INTRACAUDAL; PERINEURAL at 10:19

## 2017-04-21 RX ADMIN — HEPARIN SODIUM 5000 UNITS: 1000 INJECTION, SOLUTION INTRAVENOUS; SUBCUTANEOUS at 11:25

## 2017-04-21 RX ADMIN — HYDROCODONE BITARTRATE AND ACETAMINOPHEN 1 TABLET: 5; 325 TABLET ORAL at 14:22

## 2017-04-21 RX ADMIN — CEFAZOLIN 2 G: 1 INJECTION, POWDER, FOR SOLUTION INTRAVENOUS at 11:05

## 2017-04-21 RX ADMIN — FENTANYL CITRATE 50 MCG: 50 INJECTION INTRAMUSCULAR; INTRAVENOUS at 11:26

## 2017-04-21 RX ADMIN — HEPARIN SODIUM 1000 UNITS: 1000 INJECTION, SOLUTION INTRAVENOUS; SUBCUTANEOUS at 11:18

## 2017-04-21 RX ADMIN — PRASUGREL HYDROCHLORIDE 10 MG: 10 TABLET, FILM COATED ORAL at 10:18

## 2017-04-21 RX ADMIN — FENTANYL CITRATE 50 MCG: 50 INJECTION INTRAMUSCULAR; INTRAVENOUS at 11:11

## 2017-04-21 RX ADMIN — IPRATROPIUM BROMIDE AND ALBUTEROL SULFATE 3 ML: .5; 3 SOLUTION RESPIRATORY (INHALATION) at 10:18

## 2017-04-21 RX ADMIN — MIDAZOLAM HYDROCHLORIDE 1 MG: 1 INJECTION, SOLUTION INTRAMUSCULAR; INTRAVENOUS at 11:04

## 2017-04-21 NOTE — ANESTHESIA PREPROCEDURE EVALUATION
Anesthesia Evaluation     Patient summary reviewed and Nursing notes reviewed   no history of anesthetic complications:  NPO Status: > 8 hours   Airway   Mallampati: I  TM distance: >3 FB  Neck ROM: full  no difficulty expected  Dental    (+) poor dentition        Pulmonary - normal exam   (+) a smoker (quit 2008), COPD, shortness of breath, sleep apnea,   Cardiovascular - normal exam  Exercise tolerance: poor (<4 METS)    ECG reviewed  Patient on routine beta blocker and Beta blocker given within 24 hours of surgery    (+) hypertension, valvular problems/murmurs, past MI , CAD, cardiac stents (RCA stent 02/17, 5 total stents) angina, [place], PVD ( of distal left common iliac, P[facility] with claudication), hyperlipidemia  (-) CHF    ROS comment: 4/17 echo:  NORMAL LV AND RV SIZE AND FUNCTION  NO SIGNIFICANT VALVULAR DYSFUNCTION    #1. The left main coronary artery is an average size vessel with no significant disease  #2. The left anterior descending coronary artery is a type III vessel with one diagonal branch. There is no significant disease.  #3. The left circumflex coronary artery is an average size vessel with one large obtuse marginal branch. There is no significant disease noted. The previously deployed stent is widely patent  #4 The right coronary artery is totally occluded in the mid portion and is seen to fill from right intracoronary collaterals and left coronary collaterals    Neuro/Psych  (-) seizures, TIA, CVA  GI/Hepatic/Renal/Endo    (+)  GERD, diabetes mellitus (no meds),   (-) liver disease, renal disease    Musculoskeletal     Abdominal    Substance History      OB/GYN          Other                     Patient with recent CHYNA placed 2/17. Discontinued effient on 4/16. Discussed with Dr. Roque. He stated that he and Dr. Naranjo had agreed he could remain off of effient for 2-3 days. Given his discontinuation period was longer than recommended will give PO dose prior to OR. This has been discussed and  agreed upon by Dr. Roque.          Anesthesia Plan    ASA 3     MAC     intravenous induction   Anesthetic plan and risks discussed with patient.

## 2017-04-21 NOTE — OP NOTE
Date of Procedure:  04/21/2017    PREOPERATIVE DIAGNOSES:   1. Aortoiliac occlusive disease.   2. Peripheral arterial disease with disabling claudication.     POSTOPERATIVE DIAGNOSES:   1. Aortoiliac occlusive disease.   2. Peripheral arterial disease with disabling claudication.     PROCEDURES PERFORMED:  1. Bilateral common femoral artery cannulations.   2. Introduction of catheter into the aorta.   3. Crossing of a left iliac system chronic total occlusion by an antegrade approach with eventual flossing technique and retrograde access.   4. Balloon angioplasty of the entire left iliac system with an 8 x 48 mm Acevedo balloon in the common iliac artery, then a 7 x 60 mm Medtronic drug-coated balloon in the left external iliac artery.   5. Placement of an 8 x 39 mm Omnilink Elite balloon expandable stent in the left common iliac artery.   6. Completion angiogram with left lower extremity runoff with radiographic supervision and interpretation.   7. Bilateral common femoral artery Mynx closures.     SURGEON: Sean Roque DO    ASSISTANT: None.     ANESTHESIA: MAC with local.     ESTIMATED BLOOD LOSS: 15 mL.     COMPLICATION: None.     COUNT: Correct.     SPECIMEN: None.     INDICATIONS FOR PROCEDURE: The patient is a 61-year-old  male who has a significant history of coronary artery disease. He recently underwent heart catheterization and successful  procedure by Dr. BARNEY Naranjo. Upon further examination during his angiogram he was found to have a left iliac system occlusion with reconstitution at the distal external iliac artery; therefore, the decision was made to proceed with revascularization.     INFORMED CONSENT:  Risks and benefits were explained at great length to the patient which include, but are not limited to, bleeding, infection, vessel damage, nerve damage, embolus, and loss of limb. The patient understands the risks and wishes for me to proceed.     DESCRIPTION OF PROCEDURE:  After the patient was correctly identified the patient was brought into the operating room and placed in the supine position. Satisfactory anesthesia was then induced. Bilateral groins were then prepped and draped in the standard sterile fashion. Using ultrasound and a micropuncture technique the right common femoral artery was cannulated and a MicroSheath was placed. The Advantage Glidewire was advanced into the aorta and a short 5-South African introducer sheath was placed. The patient was given 1000 units of intravenous heparin. The Omni Flush catheter was advanced into the aorta and an aortoiliac angiogram was performed. Findings are as follows: Patent renal arteries without stenosis, patent aorta without stenosis, patent right iliac system without any significant stenosis, and occluded left iliac system from the takeoff with reconstitution of the distal external iliac artery just above the level of the femoral head. At this point the decision was made to cannulate the left common femoral artery under roadmap guidance. Once this was cannulated the MicroSheath was placed. The patient was given 5000 units of intravenous heparin for a total of 6000. The Advantage Glidewire was advanced up into the iliac occlusion and a 6-South African sheath was placed. With the use of the angled glide catheter multiple attempts were made from a retrograde approach, but I could not get it back into the true lumen in the aorta. Therefore, from the right groin the RIM catheter was used to to cannulate the left iliac system and the catheter was pushed down into the left iliac system successfully. I was able to place the wire into the left iliac sheath and floss it out through the other groin. This allowed me to get retrograde access in the true lumen back up into the aorta. Once I was able to have true lumen access established the left common iliac artery was balloon angioplastied with an 8 x 60 Liberty balloon. Next the external iliac artery was  then balloon angioplastied with a 7 x 60 mm Medtronic drug-coated balloon. Angiogram was performed which showed rapid flow down through the left iliac system without any residual stenosis and the hypogastric was patent. The decision was made to place an 8 x 39 mm Omnilink Elite balloon-expandable stent at the bifurcation in the left iliac system. It was successfully deployed. Completion angiogram was performed which showed again rapid flow down through this area without any residual stenosis or occlusion. An angiogram with runoff was performed on the left lower extremity which showed patent common femoral, profunda femoris, and SFA without stenosis, patent above-knee and below-knee popliteal arteries without stenosis, and essentially 2-1/2 vessel runoff to the foot via the anterior tibial artery and peroneal arteries. The posterior tibial artery appeared patent, but very atretic and sluggish throughout. At this point I felt the result was excellent and no further intervention was warranted. All sheaths, wires, and catheters were removed. Mynx devices were used to seal off both common femoral arteries. Direct pressure was held for 10-15 minutes to help ensure hemostasis. Sterile dressings were applied. The patient tolerated the procedure well and was transferred back to the PACU in stable condition. All counts were correct at the end of the case. I was present for the entire procedure.       cc:  ROBERT BARTH D.O. [id number]  D:  04/21/2017 13:31:18(Eastern Time)  T:  04/21/2017 14:45:26(Eastern Time)  Voice ID:  [id number]/Document ID:  [id number]

## 2017-04-21 NOTE — PLAN OF CARE
Problem: Patient Care Overview (Adult)  Goal: Plan of Care Review  Outcome: Ongoing (interventions implemented as appropriate)    04/21/17 1256   Coping/Psychosocial Response Interventions   Plan Of Care Reviewed With patient;other (see comments)  (security guards)   Patient Care Overview   Progress improving   Outcome Evaluation   Outcome Summary/Follow up Plan No bleeding or hematoma at groin sites. Puses weak but palpable on left. foot warm and pink. Meets dc criteria from Pacu.         Problem: Perioperative Period (Adult)  Goal: Signs and Symptoms of Listed Potential Problems Will be Absent or Manageable (Perioperative Period)  Outcome: Ongoing (interventions implemented as appropriate)

## 2017-04-21 NOTE — ANESTHESIA POSTPROCEDURE EVALUATION
Patient: Kingston Villagran    Procedure Summary     Date Anesthesia Start Anesthesia Stop Room / Location    04/21/17 1056 1236 [facility] 12 / [facility]       Procedure Diagnosis Surgeon Provider    LEFT LOWER EXTREMITY ANGIOGRAM, BALLOON ANGIOPLASTY, STENT PLACEMENT, MYNX CLOSURE (Left Leg Lower) PAD (peripheral artery disease)  (PAD (peripheral artery disease) [I73.9]) Sean Roque, DO Azeem Sanchez, CRNA          Anesthesia Type: MAC  Last vitals  /70 (04/21/17 1310)    Temp      Pulse 71 (04/21/17 1310)   Resp 16 (04/21/17 1310)    SpO2 100 % (04/21/17 1310)      Post Anesthesia Care and Evaluation      Comments: Pt discharged from PACU per protocol

## 2017-04-21 NOTE — H&P (VIEW-ONLY)
04/18/2017      Freedom Naranjo MD  [address] [facility] [address]  [place] [address]     Kingston Villagran  1955          Chief Complaint   Patient presents with   • Follow-up       3 wk hospital f/u/schedule angiogram of LL extremity         Dear Freedom Naranjo MD:        HPI  I had the pleasure of seeing your patient Kingston Villagran in the office today. Thank you kindly for this consultation. As you recall, Kingston Villagran is a 61 y.o. male who you are currently following for coronary artery disease. He was compplaining of chest pain with a known  of RCA and underwent a heart cath in which he had a successful  procedure. During the procedure however Dr. Naranjo revealed a total occlusion of left common iliac His left leg has been bothering him for 2-3 years, but he was told he had sciatica. He does complain of some intermittent chest pain throughout the day, even worsening since his heart cath.  He is currently maintained on Effient and Aspirin.      Medical History    Past Medical History   Diagnosis Date   • Arthritis     • Diabetes mellitus     • GERD (gastroesophageal reflux disease)     • Impaired vision in both eyes     • Irritable bowel syndrome (IBS)     • Rotator cuff injury         Right side             Surgical History           Past Surgical History   Procedure Laterality Date   • Tonsillectomy       • Pr rt/lt heart catheters N/A 12/18/2016       Procedure: Percutaneous Coronary Intervention; Surgeon: Freedom Naranjo MD; Location:  [facility]; Service: Cardiovascular   • Cardiac catheterization N/A 12/18/2016       Procedure: Left Heart Cath; Surgeon: Freedom Naranjo MD; Location:  [facility]; Service:    • Cardiac catheterization N/A 12/18/2016       Procedure: Left ventriculography; Surgeon: Freedom Naranjo MD; Location:  [facility]; Service:    • Cardiac catheterization N/A 2/23/2017       Procedure: Chronic Total Occlussion; Surgeon: Freedom Naranjo MD;  Location: [facility] LOCATION; Service:                   Family History   Problem Relation Age of Onset   • Heart disease Mother            Social History    Social History            Social History   • Marital status: Single       Spouse name: N/A   • Number of children: N/A   • Years of education: N/A          Occupational History   • Not on file.            Social History Main Topics   • Smoking status: Former Smoker       Quit date: 2008   • Smokeless tobacco: Former User   • Alcohol use No   • Drug use: No   • Sexual activity: Defer           Other Topics Concern   • Not on file      Social History Narrative            No Known Allergies             Prior to Admission medications    Medication Sig Start Date End Date Taking? Authorizing Provider   albuterol (PROVENTIL HFA;VENTOLIN HFA) 108 (90 BASE) MCG/ACT inhaler Inhale 2 puffs Every 4 (Four) Hours As Needed for Wheezing.     Yes Historical Provider, MD   aspirin 81 MG chewable tablet Chew 1 tablet Daily. 12/19/16   Yes Freedom Naranjo MD   atorvastatin (LIPITOR) 40 MG tablet Take 1 tablet by mouth Every Night. 12/21/16   Yes Freedom Naranjo MD   docusate sodium (COLACE) 100 MG capsule Take 100 mg by mouth Daily.     Yes Historical Provider, MD   ibuprofen (ADVIL,MOTRIN) 600 MG tablet Take 600 mg by mouth 2 (Two) Times a Day.     Yes Historical Provider, MD   indomethacin (INDOCIN) 50 MG capsule Take 50 mg by mouth 2 (Two) Times a Day With Meals.     Yes Historical Provider, MD   metoprolol tartrate (LOPRESSOR) 25 MG tablet Take 1 tablet by mouth Every 12 (Twelve) Hours. 12/21/16   Yes Freedom Naranjo MD   pantoprazole (PROTONIX) 40 MG EC tablet Take 40 mg by mouth Daily.     Yes Historical Provider, MD   prasugrel (EFFIENT) 10 MG tablet Take 1 tablet by mouth Daily. 12/21/16   Yes Freedom Naranjo MD         Review of Systems   Constitutional: Negative.   HENT: Negative.   Eyes: Negative.   Respiratory: Negative. Negative for shortness of breath.  "  Cardiovascular: Positive for chest pain (intermittently).   Left leg pain   Gastrointestinal: Negative.   Endocrine: Negative.   Genitourinary: Negative.   Musculoskeletal: Negative.   Skin: Negative.   Allergic/Immunologic: Negative.   Neurological: Negative.   Hematological: Negative.   Psychiatric/Behavioral: Negative.   All other systems reviewed and are negative.             Visit Vitals   • /88   • Pulse 63   • Ht 69\" (175.3 cm)   • Wt 194 lb (88 kg)   • BMI 28.65 kg/m2      Physical Exam  Constitutional: He is oriented to person, place, and time. He appears well-developed and well-nourished. No distress.   HENT:   Head: Normocephalic and atraumatic.   Mouth/Throat: Oropharynx is clear and moist and mucous membranes are normal.   Eyes: Pupils are equal, round, and reactive to light.   Neck: Normal range of motion. No JVD present. Carotid bruit is not present.   Cardiovascular: Normal rate, regular rhythm, S1 normal, S2 normal and normal heart sounds. Exam reveals no gallop and no friction rub.   No murmur heard.  Pulses:  Femoral pulses are 2+ on the right side, and 0 on the left side.  Popliteal pulses are 2+ on the right side, and 0 on the left side.   Dorsalis pedis pulses are 2+ on the right side, and 0 on the left side.   Posterior tibial pulses are 2+ on the right side, and 0 on the left side.   Pulmonary/Chest: Effort normal and breath sounds normal.   Abdominal: Soft. Normal appearance, normal aorta and bowel sounds are normal. He exhibits no abdominal bruit. There is no hepatosplenomegaly. There is no tenderness.   Musculoskeletal: Normal range of motion.   Ankle and wrist chains in place     Vascular Status - His exam exhibits no right foot edema. His exam exhibits no left foot edema.  Neurological: He is alert and oriented to person, place, and time. He has normal strength. No cranial nerve deficit.   Skin: Skin is warm, dry and intact. He is not diaphoretic.   Psychiatric: He has a normal " mood and affect. His behavior is normal. Judgment and thought content normal. Cognition and memory are normal.             Patient Active Problem List   Diagnosis   • Hyperlipidemia with target LDL less than 70   • Chronic total occlusion of native coronary artery   • Angina, class III             ICD-10-CM ICD-9-CM   1. PAD (peripheral artery disease) I73.9 443.9   2. Chronic total occlusion of native coronary artery I25.10 414.01     I25.82 414.2   3. Hyperlipidemia with target LDL less than 70 E78.5 272.4         Plan: After thoroughly evaluating Kingston Villagran, I believe the best course of action is to proceed with an angiogram of the left lower extremity. However, he is complaining of chest pain intermittently. Dr. Roque did discuss with Dr. Naranjo, and instructed to call his office to schedule a nuclear stress test. I did speak with CLAY Medellin regarding. Risks of angiogram were discussed. These include, but are not limited to, bleeding, infection, vessel damage, nerve damage, embolus, and loss of limb. The patient understands these risks and wishes to proceed with procedure. After this is performed, we will schedule his angiogram.     Thank you for allowing me to participate in the care of your patient. Please do not hesitate with any questions or concerns. I will keep you aware of any further encounters with Kingston Villagran.           Sincerely yours,            CLAY Naqvi APRN

## 2017-04-22 NOTE — PLAN OF CARE
Problem: Patient Care Overview (Adult)  Goal: Plan of Care Review  Outcome: Outcome(s) achieved Date Met:  04/21/17 04/21/17 9763   Coping/Psychosocial Response Interventions   Plan Of Care Reviewed With patient;other (see comments)  (GUARDS)   Patient Care Overview   Progress improving   Outcome Evaluation   Outcome Summary/Follow up Plan PT MEETS DISCHARGE CRITERIA, GROIN AREAS SOFT NO FURTHUR BLEEDING/HEMATOMA NOTED, VSS, STS PAIN IS TOLERABLE, PT AMBULATING AND VOIDED WITHOUT DIFFICULTY         Problem: Perioperative Period (Adult)  Goal: Signs and Symptoms of Listed Potential Problems Will be Absent or Manageable (Perioperative Period)  Outcome: Outcome(s) achieved Date Met:  04/21/17

## 2017-05-05 ENCOUNTER — OFFICE VISIT (OUTPATIENT)
Dept: VASCULAR SURGERY | Facility: CLINIC | Age: 62
End: 2017-05-05

## 2017-05-05 VITALS
HEART RATE: 78 BPM | HEIGHT: 69 IN | SYSTOLIC BLOOD PRESSURE: 128 MMHG | WEIGHT: 194 LBS | DIASTOLIC BLOOD PRESSURE: 64 MMHG | BODY MASS INDEX: 28.73 KG/M2

## 2017-05-05 DIAGNOSIS — I73.9 PAD (PERIPHERAL ARTERY DISEASE) (HCC): Primary | ICD-10-CM

## 2017-05-05 DIAGNOSIS — I25.10 CORONARY ARTERY DISEASE INVOLVING NATIVE CORONARY ARTERY OF NATIVE HEART WITHOUT ANGINA PECTORIS: ICD-10-CM

## 2017-05-05 DIAGNOSIS — E78.5 HYPERLIPIDEMIA WITH TARGET LDL LESS THAN 70: ICD-10-CM

## 2017-05-05 DIAGNOSIS — I10 ESSENTIAL HYPERTENSION: ICD-10-CM

## 2017-05-05 PROCEDURE — 99213 OFFICE O/P EST LOW 20 MIN: CPT | Performed by: NURSE PRACTITIONER

## 2017-05-30 ENCOUNTER — TELEPHONE (OUTPATIENT)
Dept: VASCULAR SURGERY | Facility: CLINIC | Age: 62
End: 2017-05-30

## 2017-06-09 ENCOUNTER — TELEPHONE (OUTPATIENT)
Dept: VASCULAR SURGERY | Facility: CLINIC | Age: 62
End: 2017-06-09

## 2017-06-09 ENCOUNTER — OFFICE VISIT (OUTPATIENT)
Dept: CARDIOLOGY | Facility: CLINIC | Age: 62
End: 2017-06-09

## 2017-06-09 VITALS
SYSTOLIC BLOOD PRESSURE: 124 MMHG | HEART RATE: 69 BPM | HEIGHT: 69 IN | BODY MASS INDEX: 27.25 KG/M2 | DIASTOLIC BLOOD PRESSURE: 92 MMHG | WEIGHT: 184 LBS

## 2017-06-09 DIAGNOSIS — I25.82 CHRONIC TOTAL OCCLUSION OF NATIVE CORONARY ARTERY: ICD-10-CM

## 2017-06-09 DIAGNOSIS — I73.9 PERIPHERAL VASCULAR DISEASE (HCC): ICD-10-CM

## 2017-06-09 DIAGNOSIS — I25.10 CHRONIC TOTAL OCCLUSION OF NATIVE CORONARY ARTERY: ICD-10-CM

## 2017-06-09 DIAGNOSIS — E78.5 HYPERLIPIDEMIA WITH TARGET LDL LESS THAN 70: ICD-10-CM

## 2017-06-09 DIAGNOSIS — I25.10 CORONARY ARTERY DISEASE INVOLVING NATIVE CORONARY ARTERY OF NATIVE HEART WITHOUT ANGINA PECTORIS: Primary | ICD-10-CM

## 2017-06-09 DIAGNOSIS — I10 ESSENTIAL HYPERTENSION: ICD-10-CM

## 2017-06-09 PROCEDURE — 99214 OFFICE O/P EST MOD 30 MIN: CPT | Performed by: INTERNAL MEDICINE

## 2017-06-09 RX ORDER — CLOTRIMAZOLE 1 %
CREAM (GRAM) TOPICAL 2 TIMES DAILY
COMMUNITY

## 2017-06-09 RX ORDER — PRASUGREL 10 MG/1
10 TABLET, FILM COATED ORAL DAILY
Qty: 90 TABLET | Refills: 3 | Status: CANCELLED | OUTPATIENT
Start: 2017-06-09

## 2017-06-09 RX ORDER — IBUPROFEN 800 MG/1
800 TABLET ORAL 2 TIMES DAILY
COMMUNITY

## 2017-06-09 NOTE — TELEPHONE ENCOUNTER
RECEIVED LETTER FROM PATIENT WHO IS INCARCERATED AT THE [facility].  THE LETTER IS DATED 5/28 BUT WAS RECEIVED THIS DATE.  PATIENT STATES CONCERNS ABOUT HIS LEG.  OFFICE HAS PREVIOUSLY TALKED TO CLAY AT [facility] WHO ADVISED THAT HE WAS NOT SYMPTOMATIC AND THAT HE HAD PULSES.  CONTACTED [facility] AGAIN THIS DATE TO MAKE SURE THAT THERE WERE NO CHANGES; SPOKE WITH MAYNOR WHO ADVISED THAT THERE WAS NO CHANGE AND PATIENT WAS DOING WELL.  SHE REQUESTED THAT I FAX LETTER TO HERE.  LETTER WAS FAXED AND CONFIRMATION WAS RECEIVED. THE LETTER HAS BEEN SCANNED INTO PATIENT CHART.

## 2017-06-09 NOTE — PROGRESS NOTES
Referring Provider: CLAY Taylor    Reason for Follow-up Visit: CAD    Subjective .   Chief Complaint:   Chief Complaint   Patient presents with   • Follow-up     hospital fu 3 month   • Coronary Artery Disease     s/p stents having no chest pain at present time.   • Shortness of Breath     all the time   • Fatigue     gets tired easy       History of present illness:  Kingston Villagran is a 61 y.o. yo male with history of CAD, s/p  of RCA last February. Also had left lower extremity revascularization by Dr. Roque. Denies any chest pain.       History  Past Medical History:   Diagnosis Date   • Arthritis    • Asthma    • COPD (chronic obstructive pulmonary disease)    • Diabetes mellitus    • GERD (gastroesophageal reflux disease)    • Glaucoma    • Hyperlipidemia    • Hypertension    • Impaired vision in both eyes    • Irritable bowel syndrome (IBS)    • Myocardial infarction    • Rotator cuff injury     Right side   • Sleep apnea     no machine   ,   Past Surgical History:   Procedure Laterality Date   • AORTAGRAM Left 4/21/2017    Procedure: LEFT LOWER EXTREMITY ANGIOGRAM, BALLOON ANGIOPLASTY, STENT PLACEMENT, MYNX CLOSURE;  Surgeon: Sean Roque DO;  Location: [facility] OR;  Service:    • CARDIAC CATHETERIZATION N/A 12/18/2016    Procedure: Left Heart Cath;  Surgeon: Freedom Naranjo MD;  Location:  [facility];  Service:    • CARDIAC CATHETERIZATION N/A 12/18/2016    Procedure: Left ventriculography;  Surgeon: Freedom Naranjo MD;  Location:  [facility];  Service:    • CARDIAC CATHETERIZATION N/A 2/23/2017    Procedure: Chronic Total Occlussion;  Surgeon: Freedom Naranjo MD;  Location:  [facility] C[facility];  Service:    • CORONARY STENT PLACEMENT     • PERIPHERAL ARTERIAL STENT GRAFT     • HI RT/LT HEART CATHETERS N/A 12/18/2016    Procedure: Percutaneous Coronary Intervention;  Surgeon: Freedom Naranjo MD;  Location:  [facility];   Service: Cardiovascular   • TONSILLECTOMY     ,   Family History   Problem Relation Age of Onset   • Heart disease Mother    • No Known Problems Father    • No Known Problems Sister    • No Known Problems Brother    • No Known Problems Maternal Grandmother    • No Known Problems Maternal Grandfather    • No Known Problems Paternal Grandmother    • No Known Problems Paternal Grandfather    • No Known Problems Sister    • No Known Problems Sister    • Cancer Sister    • No Known Problems Brother    • No Known Problems Brother    ,   Social History   Substance Use Topics   • Smoking status: Former Smoker     Start date: 1964     Quit date: 2008   • Smokeless tobacco: Never Used   • Alcohol use No   ,     Medications  Current Outpatient Prescriptions   Medication Sig Dispense Refill   • albuterol (PROVENTIL HFA;VENTOLIN HFA) 108 (90 BASE) MCG/ACT inhaler Inhale 2 puffs Every 4 (Four) Hours As Needed for Wheezing.     • aspirin 81 MG chewable tablet Chew 1 tablet Daily. 100 tablet 3   • atorvastatin (LIPITOR) 40 MG tablet Take 1 tablet by mouth Every Night. 90 tablet 3   • clotrimazole (LOTRIMIN) 1 % cream Apply  topically 2 (Two) Times a Day.     • docusate sodium (COLACE) 100 MG capsule Take 100 mg by mouth 2 (Two) Times a Day.     • ibuprofen (ADVIL,MOTRIN) 800 MG tablet Take 800 mg by mouth 2 (Two) Times a Day.     • metoprolol tartrate (LOPRESSOR) 25 MG tablet Take 1 tablet by mouth Every 12 (Twelve) Hours. 180 tablet 3   • pantoprazole (PROTONIX) 40 MG EC tablet Take 40 mg by mouth Daily.     • prasugrel (EFFIENT) 10 MG tablet Take 1 tablet by mouth Daily. 30 tablet 11     No current facility-administered medications for this visit.        Allergies:  Review of patient's allergies indicates no known allergies.    Review of Systems  Review of Systems   HENT: Negative for nosebleeds.    Cardiovascular: Negative for chest pain, claudication, dyspnea on exertion, irregular heartbeat, leg swelling, near-syncope,  "orthopnea, palpitations, paroxysmal nocturnal dyspnea and syncope.   Respiratory: Negative for cough, hemoptysis and shortness of breath.    Gastrointestinal: Negative for dysphagia, hematemesis and melena.   Genitourinary: Negative for hematuria.       Objective     Physical Exam:  /92 (BP Location: Left arm, Patient Position: Sitting, Cuff Size: Adult)  Pulse 69  Ht 69\" (175.3 cm)  Wt 184 lb (83.5 kg)  BMI 27.17 kg/m2  Physical Exam   Constitutional: He is oriented to person, place, and time. He appears well-nourished. No distress.   HENT:   Head: Normocephalic.   Eyes: No scleral icterus.   Neck: Normal range of motion. Neck supple.   Cardiovascular: Normal rate, regular rhythm and normal heart sounds.  Exam reveals no gallop and no friction rub.    No murmur heard.  Pulmonary/Chest: Effort normal and breath sounds normal. No respiratory distress. He has no wheezes. He has no rales.   Abdominal: Soft. Bowel sounds are normal. He exhibits no distension. There is no tenderness.   Musculoskeletal: He exhibits no edema.   Neurological: He is alert and oriented to person, place, and time.   Skin: Skin is warm and dry. He is not diaphoretic. No erythema.   Psychiatric: He has a normal mood and affect. His behavior is normal.       Results Review:  Procedures    Admission on 04/21/2017, Discharged on 04/21/2017   Component Date Value Ref Range Status   • ABO Type 04/21/2017 A   Final   • RH type 04/21/2017 Negative   Final   • Antibody Screen 04/21/2017 Negative   Final   • Glucose 04/21/2017 87  70 - 130 mg/dL Final    : [id number] Annette Cope  DMeter ID: [id number]   • Glucose 04/21/2017 81  70 - 130 mg/dL Final    : [id number] Adrianna Ferrell  SMeter ID: [id number]       Assessment/Plan   Kingston was seen today for follow-up, coronary artery disease, shortness of breath and fatigue.    Diagnoses and all orders for this visit:    Coronary artery disease involving native coronary artery of native heart " without angina pectoris, s/p  of RCA. Doing very well. Will change effient to plavix. It will be ok to hold plavix for 5-7 days but not aspirin in August for any dental work.     Hyperlipidemia, Patient's statin therapy is followed by PCP.    CUATE, followed by Dr. Roque

## 2017-11-08 ENCOUNTER — TELEPHONE (OUTPATIENT)
Dept: VASCULAR SURGERY | Facility: CLINIC | Age: 62
End: 2017-11-08

## 2017-11-08 ENCOUNTER — APPOINTMENT (OUTPATIENT)
Dept: ULTRASOUND IMAGING | Facility: HOSPITAL | Age: 62
End: 2017-11-08

## 2017-11-08 NOTE — TELEPHONE ENCOUNTER
Vascular Lab called and stated the lady from the [facility] called and stated that Mr. Villagran refused to come for testing and for his appointment today. She apologized for such short notice and his behavior. Will call to reschedule.

## 2017-11-08 NOTE — TELEPHONE ENCOUNTER
Spoke with Olivia from [facility] who advised that patient stated that his leg felt fine and that he did not need to come for testing.  Per APRN at facility only if another issue arises will we reschedule.

## 2018-07-03 LAB
BH CV STRESS BP STAGE 1: NORMAL
BH CV STRESS COMMENTS STAGE 1: NORMAL
BH CV STRESS DOSE REGADENOSON STAGE 1: 0.4
BH CV STRESS DURATION MIN STAGE 1: 0
BH CV STRESS DURATION SEC STAGE 1: 10
BH CV STRESS HR STAGE 1: 69
BH CV STRESS PROTOCOL 1: NORMAL
BH CV STRESS RECOVERY BP: NORMAL MMHG
BH CV STRESS RECOVERY HR: 78 BPM
BH CV STRESS STAGE 1: 1
LV EF NUC BP: 55 %
PERCENT MAX PREDICTED HR: 43.95 %
STRESS BASELINE BP: NORMAL MMHG
STRESS BASELINE HR: 66 BPM
STRESS PERCENT HR: 52 %
STRESS POST PEAK BP: NORMAL MMHG
STRESS POST PEAK HR: 69 BPM

## 2020-03-09 ENCOUNTER — OFFICE VISIT (OUTPATIENT)
Dept: CARDIOLOGY | Facility: CLINIC | Age: 65
End: 2020-03-09

## 2020-03-09 VITALS
HEIGHT: 69 IN | WEIGHT: 213 LBS | DIASTOLIC BLOOD PRESSURE: 64 MMHG | BODY MASS INDEX: 31.55 KG/M2 | OXYGEN SATURATION: 98 % | SYSTOLIC BLOOD PRESSURE: 90 MMHG | HEART RATE: 78 BPM

## 2020-03-09 DIAGNOSIS — E78.5 HYPERLIPIDEMIA WITH TARGET LDL LESS THAN 70: ICD-10-CM

## 2020-03-09 DIAGNOSIS — I73.9 PAD (PERIPHERAL ARTERY DISEASE) (HCC): ICD-10-CM

## 2020-03-09 DIAGNOSIS — I10 ESSENTIAL HYPERTENSION: ICD-10-CM

## 2020-03-09 DIAGNOSIS — I25.10 CORONARY ARTERY DISEASE INVOLVING NATIVE CORONARY ARTERY OF NATIVE HEART WITHOUT ANGINA PECTORIS: Primary | ICD-10-CM

## 2020-03-09 PROBLEM — I20.9 ANGINA, CLASS III (HCC): Status: RESOLVED | Noted: 2017-02-23 | Resolved: 2020-03-09

## 2020-03-09 PROCEDURE — 99214 OFFICE O/P EST MOD 30 MIN: CPT | Performed by: INTERNAL MEDICINE

## 2020-03-09 PROCEDURE — 93000 ELECTROCARDIOGRAM COMPLETE: CPT | Performed by: INTERNAL MEDICINE

## 2020-03-09 RX ORDER — AMITRIPTYLINE HYDROCHLORIDE 50 MG/1
50 TABLET, FILM COATED ORAL 2 TIMES DAILY
COMMUNITY

## 2020-03-09 RX ORDER — NITROGLYCERIN 0.4 MG/1
0.4 TABLET SUBLINGUAL
COMMUNITY

## 2020-03-09 RX ORDER — METOPROLOL TARTRATE 50 MG/1
50 TABLET, FILM COATED ORAL 2 TIMES DAILY
COMMUNITY

## 2020-03-09 RX ORDER — LEVALBUTEROL TARTRATE 45 UG/1
1-2 AEROSOL, METERED ORAL EVERY 4 HOURS PRN
COMMUNITY

## 2020-03-09 RX ORDER — LISINOPRIL AND HYDROCHLOROTHIAZIDE 25; 20 MG/1; MG/1
1 TABLET ORAL DAILY
COMMUNITY

## 2020-03-09 NOTE — PROGRESS NOTES
Referring Provider: Diya Montes De Oca APRN    Reason for Consultation: CAD    Chief complaint:   Chief Complaint   Patient presents with   • Follow-up     LOV 6/9/2017 pt has been incarcerated and the nurse at the clinic there referred pt back because of a history of a MI with stents and pt has been having chest pain.   • Coronary Artery Disease     pt states he had some chest pain about a month ago that he thought could have been acid reflux.  he was given Nitro twice then he got a head ache.  pt states he had some sob and this has not gone away.  pt states he has had some numbness in the left arm but htis has subsided now.   • Hypertension     pt states they check his bp at the center and it goes up and down.  it was low today.   • Hyperlipidemia     labs done in 12/2019 LDL was high at 136  he is on Lipitor 40 mg.       Subjective .     History of present illness:  Kingston Villagran is a 64 y.o. yo male with history of CAD, s/p  of the RCA in the past who presents today for routine follow up. 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 He denies any CP or SOB. His only complaint is muscle cramps  Chief Complaint   Patient presents with   • Follow-up     LOV 6/9/2017 pt has been incarcerated and the nurse at the clinic there referred pt back because of a history of a MI with stents and pt has been having chest pain.   • Coronary Artery Disease     pt states he had some chest pain about a month ago that he thought could have been acid reflux.  he was given Nitro twice then he got a head ache.  pt states he had some sob and this has not gone away.  pt states he has had some numbness in the left arm but htis has subsided now.   • Hypertension     pt states they check his bp at the center and it goes up and down.  it was low today.   • Hyperlipidemia     labs done in 12/2019 LDL was high at 136  he is on Lipitor 40 mg.   .    History  Past Medical History:   Diagnosis Date   • Arthritis    • Asthma    •  COPD (chronic obstructive pulmonary disease) (CMS/HCC)    • Diabetes mellitus (CMS/HCC)    • GERD (gastroesophageal reflux disease)    • Glaucoma    • Hyperlipidemia    • Hypertension    • Impaired vision in both eyes    • Irritable bowel syndrome (IBS)    • Myocardial infarction (CMS/[facility])    • Rotator cuff injury     Right side   • Sleep apnea     no machine   ,   Past Surgical History:   Procedure Laterality Date   • AORTAGRAM Left 4/21/2017    Procedure: LEFT LOWER EXTREMITY ANGIOGRAM, BALLOON ANGIOPLASTY, STENT PLACEMENT, MYNX CLOSURE;  Surgeon: Sean Roque DO;  Location:  [facility] OR;  Service:    • CARDIAC CATHETERIZATION N/A 12/18/2016    Procedure: Left Heart Cath;  Surgeon: Freedom Naranjo MD;  Location:  [facility] CATH INVASIVE LOCATION;  Service:    • CARDIAC CATHETERIZATION N/A 12/18/2016    Procedure: Left ventriculography;  Surgeon: Freedom Naranjo MD;  Location:  [facility] CATH INVASIVE LOCATION;  Service:    • CARDIAC CATHETERIZATION N/A 2/23/2017    Procedure: Chronic Total Occlussion;  Surgeon: Freedom Naranjo MD;  Location:  [facility] CATH INVASIVE LOC[facility];  Service:    • CORONARY STENT PLACEMENT     • PERIPHERAL ARTERIAL STENT GRAFT     • GA RT/LT HEART CATHETERS N/A 12/18/2016    Procedure: Percutaneous Coronary Intervention;  Surgeon: Freedom Naranjo MD;  Location:  [facility] CATH INVASIVE LOCATION;  Service: Cardiovascular   • TONSILLECTOMY     ,   Family History   Problem Relation Age of Onset   • Heart disease Mother    • Alzheimer's disease Mother    • No Known Problems Father    • Alcohol abuse Sister    • Liver disease Sister    • No Known Problems Brother    • No Known Problems Maternal Grandmother    • No Known Problems Maternal Grandfather    • No Known Problems Paternal Grandmother    • No Known Problems Paternal Grandfather    • No Known Problems Sister    • No Known Problems Sister    • Cancer Sister    • No Known Problems Brother    • No Known Problems Brother    ,   Social History     Tobacco Use    • Smoking status: Former Smoker     Start date:      Last attempt to quit: 2008     Years since quittin.1   • Smokeless tobacco: Never Used   Substance Use Topics   • Alcohol use: Not Currently   • Drug use: Never   ,     Medications  Current Outpatient Medications   Medication Sig Dispense Refill   • amitriptyline (ELAVIL) 50 MG tablet Take 50 mg by mouth 2 (Two) Times a Day.     • aspirin 81 MG chewable tablet Chew 1 tablet Daily. 100 tablet 3   • atorvastatin (LIPITOR) 40 MG tablet Take 1 tablet by mouth Every Night. 90 tablet 3   • docusate sodium (COLACE) 100 MG capsule Take 100 mg by mouth 2 (Two) Times a Day.     • ibuprofen (ADVIL,MOTRIN) 800 MG tablet Take 800 mg by mouth 2 (Two) Times a Day.     • levalbuterol (XOPENEX HFA) 45 MCG/ACT inhaler Inhale 1-2 puffs Every 4 (Four) Hours As Needed for Wheezing.     • lisinopril-hydrochlorothiazide (PRINZIDE,ZESTORETIC) 20-25 MG per tablet Take 1 tablet by mouth Daily.     • metoprolol tartrate (LOPRESSOR) 50 MG tablet Take 50 mg by mouth 2 (Two) Times a Day.     • nitroglycerin (NITROSTAT) 0.4 MG SL tablet Place 0.4 mg under the tongue Every 5 (Five) Minutes As Needed for Chest Pain. Take no more than 3 doses in 15 minutes.     • pantoprazole (PROTONIX) 40 MG EC tablet Take 40 mg by mouth Daily.     • albuterol (PROVENTIL HFA;VENTOLIN HFA) 108 (90 BASE) MCG/ACT inhaler Inhale 2 puffs Every 4 (Four) Hours As Needed for Wheezing.     • clotrimazole (LOTRIMIN) 1 % cream Apply  topically 2 (Two) Times a Day.     • metoprolol tartrate (LOPRESSOR) 25 MG tablet Take 1 tablet by mouth Every 12 (Twelve) Hours. (Patient not taking: Reported on 3/9/2020) 180 tablet 3   • prasugrel (EFFIENT) 10 MG tablet Take 1 tablet by mouth Daily. 30 tablet 11     No current facility-administered medications for this visit.         Allergies:  Patient has no known allergies.    Review of Systems  Review of Systems   HENT: Negative for nosebleeds.    Cardiovascular: Negative for  "chest pain, claudication, dyspnea on exertion, irregular heartbeat, leg swelling, near-syncope, orthopnea, palpitations, paroxysmal nocturnal dyspnea and syncope.   Respiratory: Negative for cough, hemoptysis and shortness of breath.    Gastrointestinal: Negative for dysphagia, hematemesis and melena.   Genitourinary: Negative for hematuria.   All other systems reviewed and are negative.      Objective     Physical Exam:  BP 90/64   Pulse 78   Ht 175.3 cm (69\")   Wt 96.6 kg (213 lb)   SpO2 98%   BMI 31.45 kg/m²   Physical Exam   Constitutional: He is oriented to person, place, and time. He appears well-nourished. No distress.   HENT:   Head: Normocephalic.   Eyes: No scleral icterus.   Neck: Normal range of motion. Neck supple.   Cardiovascular: Normal rate, regular rhythm and normal heart sounds. Exam reveals no gallop and no friction rub.   No murmur heard.  Pulmonary/Chest: Effort normal and breath sounds normal. No respiratory distress. He has no wheezes. He has no rales.   Abdominal: Soft. Bowel sounds are normal. He exhibits no distension. There is no tenderness.   Musculoskeletal: He exhibits no edema.   Neurological: He is alert and oriented to person, place, and time.   Skin: Skin is warm and dry. He is not diaphoretic. No erythema.   Psychiatric: He has a normal mood and affect. His behavior is normal.       Results Review:   I reviewed the patient's new clinical results.    ECG 12 Lead  Date/Time: 3/9/2020 9:17 AM  Performed by: Freedom Naranjo MD  Authorized by: Freedom Naranjo MD   Comparison: compared with previous ECG   Similar to previous ECG  Rhythm: sinus rhythm  Rate: normal  Conduction: incomplete right bundle branch block  ST Segments: ST segments normal  T Waves: T waves normal  QRS axis: normal    Clinical impression: abnormal EKG            Admission on 04/21/2017, Discharged on 04/21/2017   Component Date Value Ref Range Status   • ABO Type 04/21/2017 A   Final   • RH type 04/21/2017 " Negative   Final   • Antibody Screen 04/21/2017 Negative   Final   • Glucose 04/21/2017 87  70 - 130 mg/dL Final    : [id number] Annette Cope  DMeter ID: [id number]   • Glucose 04/21/2017 81  70 - 130 mg/dL Final    : [id number] Adrianna Ferrell  SMradha ID: [id number]       Assessment/Plan   Kingston was seen today for follow-up, coronary artery disease, hypertension and hyperlipidemia.    Diagnoses and all orders for this visit:    Hyperlipidemia with target LDL less than 70, followed by the [facility] physician. May need to decrease the dose of lipitor if his LDL is well controlled to see if his muscle symptoms improve    Coronary artery disease involving native coronary artery of native heart without angina pectoris, The patient denies chest pain, shortness of breath, dyspnea on exertion, orthopnea, PND, edema. There is no evidence of ongoing ischemia    Essential hypertension, on the low side now    PAD (peripheral artery disease) (CMS/HCC), has history of Iliac occlusion that may have been fixed by Dr. Roque        Patient's Body mass index is 31.45 kg/m². BMI is within normal parameters. No follow-up required..

## 2023-07-23 NOTE — BRIEF OP NOTE
AORTAGRAM WITH OR WITHOUT RUNOFFS POSSIBLE STENT  Procedure Note    Kingston Villagran  4/21/2017    Pre-op Diagnosis:   PAD (peripheral artery disease) [I73.9]    Post-op Diagnosis:     Post-Op Diagnosis Codes:     * PAD (peripheral artery disease) [I73.9]    Procedure/CPT® Codes:      Procedure(s):  LEFT LOWER EXTREMITY ANGIOGRAM, BALLOON ANGIOPLASTY, STENT PLACEMENT, MYNX CLOSURE    Surgeon(s):  Sean Roque DO    Anesthesia: Monitor Anesthesia Care    Staff:   Circulator: Carlota Moscoso RN  Scrub Person: Mary Kay Hooper  Vendor Representative: Sree Mcclendon  Assistant: Flower Warren  Vascular Radiology Technician: Cecily Guaman    Estimated Blood Loss: 15ml    Specimens:                * No specimens in log *      Drains:             Complications: none      Sean Roque DO     Date: 4/21/2017  Time: 12:22 PM      
impairments found

## (undated) DEVICE — CATH FLSH OMNI SFT 5F 90CM

## (undated) DEVICE — DRSNG SURESITE WNDW 4X4.5

## (undated) DEVICE — CANN CO2/O2 NASL A/

## (undated) DEVICE — ELECTRD PAD DEFIB A/

## (undated) DEVICE — Device

## (undated) DEVICE — GW GRAPHX .014 182CM

## (undated) DEVICE — CATH TEMPO 5F RIM 65CM: Brand: TEMPO

## (undated) DEVICE — VLV HEMO GUARDIAN INSRT/TOOL W TORQ DEV

## (undated) DEVICE — MYNX ACE VASCULAR CLOSURE DEVICE (6F/7F): Brand: MYNX ACE VASCULAR CLOSURE DEVICE (6F/7F)

## (undated) DEVICE — PTCA DILATATION CATHETER: Brand: EMERGE™

## (undated) DEVICE — SOL IRR NACL 0.9PCT BT 1000ML

## (undated) DEVICE — MODEL AT P65, P/N 701554-001KIT CONTENTS: HAND CONTROLLER, 3-WAY HIGH-PRESSURE STOPCOCK WITH ROTATING END AND PREMIUM HIGH-PRESSURE TUBING: Brand: ANGIOTOUCH® KIT

## (undated) DEVICE — SYR LL TP 10ML STRL

## (undated) DEVICE — PERCLOSE PROGLIDE™ SUTURE-MEDIATED CLOSURE SYSTEM: Brand: PERCLOSE PROGLIDE™

## (undated) DEVICE — GW STARTER FXD CORE J .035 3X150CM 3MM

## (undated) DEVICE — STPCK 3/WY HP M/RA W/OFF/HNDL 1050PSI STRL

## (undated) DEVICE — SOL NS 500ML

## (undated) DEVICE — RADIFOCUS GLIDECATH: Brand: GLIDECATH

## (undated) DEVICE — CATH DIAG IMPULSE FR3.5 5F 100CM

## (undated) DEVICE — ARMADA 35 PTA CATHETER 8 MM X 60 MM X 80 CM / OVER-THE-WIRE: Brand: ARMADA

## (undated) DEVICE — PINNACLE INTRODUCER SHEATH: Brand: PINNACLE

## (undated) DEVICE — INFLATION DEVICE: Brand: ENCORE™ 26

## (undated) DEVICE — GUIDE CATHETER: Brand: MACH1™

## (undated) DEVICE — GLV SURG NEOLON 2G PF LF 7.5 STRL

## (undated) DEVICE — CATH CTO CROSSBOSS W/TORQ DEV .040IN 135

## (undated) DEVICE — A2000 MULTI-USE SYRINGE KIT, P/N 701277-003KIT CONTENTS: 100ML CONTRAST RESERVOIR AND TUBING WITH CONTRAST SPIKE AND CLAMP: Brand: A2000 MULTI-USE SYRINGE KIT

## (undated) DEVICE — DRSNG PRESS SAFEGUARD

## (undated) DEVICE — 6F .070 XB LAD 3.5 100CM: Brand: VISTA BRITE TIP

## (undated) DEVICE — ST MIC/INTRO ACC SHRP/NDL TUNG/TP NITNL 5F 45CM 7CM

## (undated) DEVICE — HI-TORQUE PILOT 200 GUIDE WIRE .014 STRAIGHT TIP 3.0 CM X 190 CM: Brand: HI-TORQUE PILOT

## (undated) DEVICE — PROXIMATE RH ROTATING HEAD SKIN STAPLERS (35 WIDE) CONTAINS 35 STAINLESS STEEL STAPLES: Brand: PROXIMATE

## (undated) DEVICE — PAD ENDOVASCULAR: Brand: MEDLINE INDUSTRIES, INC.

## (undated) DEVICE — SOLIDIFIER LIQUI LOC PLUS 2000CC

## (undated) DEVICE — RADIFOCUS GLIDEWIRE ADVANTAGE GUIDEWIRE: Brand: GLIDEWIRE ADVANTAGE

## (undated) DEVICE — GLIDESHEATH SLENDER STAINLESS STEEL KIT: Brand: GLIDESHEATH SLENDER

## (undated) DEVICE — MODEL BT2000 P/N 700287-012KIT CONTENTS: MANIFOLD WITH SALINE AND CONTRAST PORTS, SALINE TUBING WITH SPIKE AND HAND SYRINGE, TRANSDUCER: Brand: BT2000 AUTOMATED MANIFOLD KIT

## (undated) DEVICE — PK CATH CARD 30

## (undated) DEVICE — MYNXGRIP 5F: Brand: MYNXGRIP

## (undated) DEVICE — GW ZIPWIRE STD ANGL .035IN 150CM

## (undated) DEVICE — SUP ARMBRD ART/LINE BLU

## (undated) DEVICE — Device: Brand: FIELDER XT

## (undated) DEVICE — TR BAND RADIAL ARTERY COMPRESSION DEVICE: Brand: TR BAND

## (undated) DEVICE — PK TURNOVER RM ADV

## (undated) DEVICE — BG BANDED WRUBBER BAND AND TP 36X54IN

## (undated) DEVICE — Device: Brand: MEDEX

## (undated) DEVICE — USE OF THE SMARTNEEDLE DEVICE IS INDICATED WHEN BLOOD FLOW MUST BE DETECTED FOR PERCUTANEOUS VESSEL CANNULATION. THE VESSEL MUST BE OF A CALIBER WHICH WOULD NORMALLY BE PUNCTURED WITH A NEEDLE AND/OR CATHETER OF THIS SIZE OR LARGER.: Brand: SMARTNEEDLE® VASCULAR ACCESS SYSTEM